# Patient Record
Sex: MALE | Race: WHITE | Employment: UNEMPLOYED | URBAN - METROPOLITAN AREA
[De-identification: names, ages, dates, MRNs, and addresses within clinical notes are randomized per-mention and may not be internally consistent; named-entity substitution may affect disease eponyms.]

---

## 2024-03-22 ENCOUNTER — APPOINTMENT (OUTPATIENT)
Dept: GENERAL RADIOLOGY | Age: 64
DRG: 640 | End: 2024-03-22
Payer: MEDICARE

## 2024-03-22 ENCOUNTER — APPOINTMENT (OUTPATIENT)
Dept: CT IMAGING | Age: 64
DRG: 640 | End: 2024-03-22
Payer: MEDICARE

## 2024-03-22 ENCOUNTER — HOSPITAL ENCOUNTER (INPATIENT)
Age: 64
LOS: 20 days | Discharge: NURSING, REHAB OR LONG-TERM CARE | DRG: 640 | End: 2024-04-11
Attending: EMERGENCY MEDICINE | Admitting: FAMILY MEDICINE
Payer: MEDICARE

## 2024-03-22 DIAGNOSIS — J44.9 CHRONIC OBSTRUCTIVE PULMONARY DISEASE, UNSPECIFIED COPD TYPE (HCC): ICD-10-CM

## 2024-03-22 DIAGNOSIS — R41.82 ALTERED MENTAL STATUS, UNSPECIFIED ALTERED MENTAL STATUS TYPE: Primary | ICD-10-CM

## 2024-03-22 DIAGNOSIS — E87.1 HYPONATREMIA: ICD-10-CM

## 2024-03-22 PROBLEM — J69.0 ASPIRATION PNEUMONIA OF BOTH LOWER LOBES (HCC): Status: ACTIVE | Noted: 2024-03-22

## 2024-03-22 PROBLEM — G93.41 ACUTE METABOLIC ENCEPHALOPATHY: Status: ACTIVE | Noted: 2024-03-22

## 2024-03-22 PROBLEM — I10 HYPERTENSION: Status: ACTIVE | Noted: 2024-03-22

## 2024-03-22 LAB
ALBUMIN SERPL-MCNC: 3.4 G/DL (ref 3.5–5.2)
ALP SERPL-CCNC: 93 U/L (ref 40–129)
ALT SERPL-CCNC: 12 U/L (ref 5–41)
AMPHET UR QL SCN: NEGATIVE
ANION GAP SERPL CALCULATED.3IONS-SCNC: 9 MMOL/L (ref 9–17)
AST SERPL-CCNC: 21 U/L
BARBITURATES UR QL SCN: NEGATIVE
BASOPHILS # BLD: 0.07 K/UL (ref 0–0.2)
BASOPHILS NFR BLD: 1 % (ref 0–2)
BENZODIAZ UR QL: NEGATIVE
BILIRUB SERPL-MCNC: 0.3 MG/DL (ref 0.3–1.2)
BILIRUB UR QL STRIP: NEGATIVE
BNP SERPL-MCNC: 139 PG/ML
BUN SERPL-MCNC: 19 MG/DL (ref 8–23)
BUN/CREAT SERPL: 24 (ref 9–20)
CALCIUM SERPL-MCNC: 10 MG/DL (ref 8.6–10.4)
CANNABINOIDS UR QL SCN: POSITIVE
CHLORIDE SERPL-SCNC: 77 MMOL/L (ref 98–107)
CHLORIDE UR-SCNC: 40 MMOL/L
CLARITY UR: CLEAR
CO2 SERPL-SCNC: 39 MMOL/L (ref 20–31)
COCAINE UR QL SCN: NEGATIVE
COLOR UR: YELLOW
CREAT SERPL-MCNC: 0.8 MG/DL (ref 0.7–1.2)
CRITICAL ACTION: NORMAL
CRITICAL NOTIFICATION DATE/TIME: NORMAL
CRITICAL NOTIFICATION: NORMAL
CRITICAL VALUE READ BACK: YES
EOSINOPHIL # BLD: 0.08 K/UL (ref 0–0.44)
EOSINOPHILS RELATIVE PERCENT: 1 % (ref 1–4)
EPI CELLS #/AREA URNS HPF: ABNORMAL /HPF (ref 0–5)
ERYTHROCYTE [DISTWIDTH] IN BLOOD BY AUTOMATED COUNT: 13.9 % (ref 11.8–14.4)
ETHANOL PERCENT: <0.01 %
ETHANOLAMINE SERPL-MCNC: <10 MG/DL
FENTANYL UR QL: NEGATIVE
FLUAV RNA RESP QL NAA+PROBE: NOT DETECTED
FLUBV RNA RESP QL NAA+PROBE: NOT DETECTED
GFR SERPL CREATININE-BSD FRML MDRD: >60 ML/MIN/1.73M2
GLUCOSE SERPL-MCNC: 80 MG/DL (ref 70–99)
GLUCOSE UR STRIP-MCNC: NEGATIVE MG/DL
HCO3 VENOUS: 46.4 MMOL/L (ref 22–29)
HCT VFR BLD AUTO: 43.7 % (ref 40.7–50.3)
HGB BLD-MCNC: 14.7 G/DL (ref 13–17)
HGB UR QL STRIP.AUTO: NEGATIVE
IMM GRANULOCYTES # BLD AUTO: 0.06 K/UL (ref 0–0.3)
IMM GRANULOCYTES NFR BLD: 1 %
INR PPP: 1
KETONES UR STRIP-MCNC: ABNORMAL MG/DL
LACTATE BLDV-SCNC: 1.3 MMOL/L (ref 0.5–2.2)
LEUKOCYTE ESTERASE UR QL STRIP: NEGATIVE
LYMPHOCYTES NFR BLD: 3.11 K/UL (ref 1.1–3.7)
LYMPHOCYTES RELATIVE PERCENT: 28 % (ref 24–43)
MAGNESIUM SERPL-MCNC: 1.9 MG/DL (ref 1.6–2.6)
MCH RBC QN AUTO: 32.7 PG (ref 25.2–33.5)
MCHC RBC AUTO-ENTMCNC: 33.6 G/DL (ref 28.4–34.8)
MCV RBC AUTO: 97.3 FL (ref 82.6–102.9)
METHADONE UR QL: NEGATIVE
MONOCYTES NFR BLD: 1.16 K/UL (ref 0.1–1.2)
MONOCYTES NFR BLD: 10 % (ref 3–12)
NEUTROPHILS NFR BLD: 59 % (ref 36–65)
NEUTS SEG NFR BLD: 6.79 K/UL (ref 1.5–8.1)
NITRITE UR QL STRIP: NEGATIVE
NRBC BLD-RTO: 0 PER 100 WBC
O2 SAT, VEN: 61.7 % (ref 60–85)
OPIATES UR QL SCN: NEGATIVE
OSMOLALITY UR: 406 MOSM/KG (ref 80–1300)
OXYCODONE UR QL SCN: NEGATIVE
PATIENT TEMP: 37
PCO2, VEN: 72 MM HG (ref 41–51)
PCP UR QL SCN: NEGATIVE
PH UR STRIP: 6 [PH] (ref 5–8)
PH VENOUS: 7.42 (ref 7.32–7.43)
PLATELET # BLD AUTO: 352 K/UL (ref 138–453)
PMV BLD AUTO: 9.1 FL (ref 8.1–13.5)
PO2, VEN: 33.5 MM HG (ref 30–50)
POSITIVE BASE EXCESS, VEN: 16.6 MMOL/L (ref 0–3)
POTASSIUM SERPL-SCNC: 4.5 MMOL/L (ref 3.7–5.3)
PROT SERPL-MCNC: 7.3 G/DL (ref 6.4–8.3)
PROT UR STRIP-MCNC: ABNORMAL MG/DL
PROTHROMBIN TIME: 13.2 SEC (ref 11.5–14.2)
RBC # BLD AUTO: 4.49 M/UL (ref 4.21–5.77)
RBC #/AREA URNS HPF: ABNORMAL /HPF (ref 0–2)
SARS-COV-2 RNA RESP QL NAA+PROBE: NOT DETECTED
SODIUM SERPL-SCNC: 124 MMOL/L (ref 135–144)
SODIUM SERPL-SCNC: 124 MMOL/L (ref 135–144)
SODIUM SERPL-SCNC: 125 MMOL/L (ref 135–144)
SODIUM SERPL-SCNC: 127 MMOL/L (ref 135–144)
SODIUM UR-SCNC: 40 MMOL/L
SOURCE: NORMAL
SP GR UR STRIP: 1.02 (ref 1–1.03)
SPECIMEN DESCRIPTION: NORMAL
TEST INFORMATION: ABNORMAL
TROPONIN I SERPL HS-MCNC: 35 NG/L (ref 0–22)
TROPONIN I SERPL HS-MCNC: 35 NG/L (ref 0–22)
UROBILINOGEN UR STRIP-ACNC: NORMAL EU/DL (ref 0–1)
WBC #/AREA URNS HPF: ABNORMAL /HPF (ref 0–5)
WBC OTHER # BLD: 11.3 K/UL (ref 3.5–11.3)

## 2024-03-22 PROCEDURE — 36415 COLL VENOUS BLD VENIPUNCTURE: CPT

## 2024-03-22 PROCEDURE — 6360000002 HC RX W HCPCS: Performed by: EMERGENCY MEDICINE

## 2024-03-22 PROCEDURE — 2580000003 HC RX 258: Performed by: EMERGENCY MEDICINE

## 2024-03-22 PROCEDURE — 6370000000 HC RX 637 (ALT 250 FOR IP): Performed by: NURSE PRACTITIONER

## 2024-03-22 PROCEDURE — 80053 COMPREHEN METABOLIC PANEL: CPT

## 2024-03-22 PROCEDURE — 99285 EMERGENCY DEPT VISIT HI MDM: CPT

## 2024-03-22 PROCEDURE — 83935 ASSAY OF URINE OSMOLALITY: CPT

## 2024-03-22 PROCEDURE — 84300 ASSAY OF URINE SODIUM: CPT

## 2024-03-22 PROCEDURE — 87636 SARSCOV2 & INF A&B AMP PRB: CPT

## 2024-03-22 PROCEDURE — 85025 COMPLETE CBC W/AUTO DIFF WBC: CPT

## 2024-03-22 PROCEDURE — 6370000000 HC RX 637 (ALT 250 FOR IP)

## 2024-03-22 PROCEDURE — G0480 DRUG TEST DEF 1-7 CLASSES: HCPCS

## 2024-03-22 PROCEDURE — 99223 1ST HOSP IP/OBS HIGH 75: CPT | Performed by: INTERNAL MEDICINE

## 2024-03-22 PROCEDURE — 84484 ASSAY OF TROPONIN QUANT: CPT

## 2024-03-22 PROCEDURE — 94761 N-INVAS EAR/PLS OXIMETRY MLT: CPT

## 2024-03-22 PROCEDURE — 6370000000 HC RX 637 (ALT 250 FOR IP): Performed by: INTERNAL MEDICINE

## 2024-03-22 PROCEDURE — 83735 ASSAY OF MAGNESIUM: CPT

## 2024-03-22 PROCEDURE — 94640 AIRWAY INHALATION TREATMENT: CPT

## 2024-03-22 PROCEDURE — 6360000002 HC RX W HCPCS: Performed by: INTERNAL MEDICINE

## 2024-03-22 PROCEDURE — 84295 ASSAY OF SERUM SODIUM: CPT

## 2024-03-22 PROCEDURE — 70450 CT HEAD/BRAIN W/O DYE: CPT

## 2024-03-22 PROCEDURE — 87040 BLOOD CULTURE FOR BACTERIA: CPT

## 2024-03-22 PROCEDURE — 2060000000 HC ICU INTERMEDIATE R&B

## 2024-03-22 PROCEDURE — 2580000003 HC RX 258

## 2024-03-22 PROCEDURE — 87086 URINE CULTURE/COLONY COUNT: CPT

## 2024-03-22 PROCEDURE — 81001 URINALYSIS AUTO W/SCOPE: CPT

## 2024-03-22 PROCEDURE — 71045 X-RAY EXAM CHEST 1 VIEW: CPT

## 2024-03-22 PROCEDURE — 96374 THER/PROPH/DIAG INJ IV PUSH: CPT

## 2024-03-22 PROCEDURE — 87184 SC STD DISK METHOD PER PLATE: CPT

## 2024-03-22 PROCEDURE — 87077 CULTURE AEROBIC IDENTIFY: CPT

## 2024-03-22 PROCEDURE — 83880 ASSAY OF NATRIURETIC PEPTIDE: CPT

## 2024-03-22 PROCEDURE — 6360000002 HC RX W HCPCS

## 2024-03-22 PROCEDURE — 82803 BLOOD GASES ANY COMBINATION: CPT

## 2024-03-22 PROCEDURE — 93005 ELECTROCARDIOGRAM TRACING: CPT

## 2024-03-22 PROCEDURE — 2700000000 HC OXYGEN THERAPY PER DAY

## 2024-03-22 PROCEDURE — 2580000003 HC RX 258: Performed by: INTERNAL MEDICINE

## 2024-03-22 PROCEDURE — 2580000003 HC RX 258: Performed by: NURSE PRACTITIONER

## 2024-03-22 PROCEDURE — 83605 ASSAY OF LACTIC ACID: CPT

## 2024-03-22 PROCEDURE — 80307 DRUG TEST PRSMV CHEM ANLYZR: CPT

## 2024-03-22 PROCEDURE — 87186 SC STD MICRODIL/AGAR DIL: CPT

## 2024-03-22 PROCEDURE — 85610 PROTHROMBIN TIME: CPT

## 2024-03-22 PROCEDURE — 82436 ASSAY OF URINE CHLORIDE: CPT

## 2024-03-22 RX ORDER — SODIUM CHLORIDE 0.9 % (FLUSH) 0.9 %
5-40 SYRINGE (ML) INJECTION EVERY 12 HOURS SCHEDULED
Status: DISCONTINUED | OUTPATIENT
Start: 2024-03-22 | End: 2024-04-11 | Stop reason: HOSPADM

## 2024-03-22 RX ORDER — PANTOPRAZOLE SODIUM 40 MG/1
40 TABLET, DELAYED RELEASE ORAL
Status: DISCONTINUED | OUTPATIENT
Start: 2024-03-23 | End: 2024-04-11 | Stop reason: HOSPADM

## 2024-03-22 RX ORDER — CYCLOBENZAPRINE HCL 10 MG
10 TABLET ORAL 3 TIMES DAILY PRN
COMMUNITY
Start: 2024-02-20

## 2024-03-22 RX ORDER — LANOLIN ALCOHOL/MO/W.PET/CERES
3 CREAM (GRAM) TOPICAL NIGHTLY PRN
Status: DISCONTINUED | OUTPATIENT
Start: 2024-03-22 | End: 2024-04-03

## 2024-03-22 RX ORDER — AZITHROMYCIN 250 MG/1
500 TABLET, FILM COATED ORAL DAILY
Status: COMPLETED | OUTPATIENT
Start: 2024-03-22 | End: 2024-03-24

## 2024-03-22 RX ORDER — ONDANSETRON 4 MG/1
4 TABLET, ORALLY DISINTEGRATING ORAL EVERY 8 HOURS PRN
Status: DISCONTINUED | OUTPATIENT
Start: 2024-03-22 | End: 2024-04-11 | Stop reason: HOSPADM

## 2024-03-22 RX ORDER — ACETAMINOPHEN 650 MG/1
650 SUPPOSITORY RECTAL EVERY 6 HOURS PRN
Status: DISCONTINUED | OUTPATIENT
Start: 2024-03-22 | End: 2024-04-11 | Stop reason: HOSPADM

## 2024-03-22 RX ORDER — ONDANSETRON 2 MG/ML
4 INJECTION INTRAMUSCULAR; INTRAVENOUS EVERY 6 HOURS PRN
Status: DISCONTINUED | OUTPATIENT
Start: 2024-03-22 | End: 2024-04-11 | Stop reason: HOSPADM

## 2024-03-22 RX ORDER — POTASSIUM CHLORIDE 7.45 MG/ML
10 INJECTION INTRAVENOUS PRN
Status: DISCONTINUED | OUTPATIENT
Start: 2024-03-22 | End: 2024-04-11 | Stop reason: HOSPADM

## 2024-03-22 RX ORDER — IPRATROPIUM BROMIDE AND ALBUTEROL SULFATE 2.5; .5 MG/3ML; MG/3ML
1 SOLUTION RESPIRATORY (INHALATION) EVERY 4 HOURS PRN
Status: DISCONTINUED | OUTPATIENT
Start: 2024-03-22 | End: 2024-04-11 | Stop reason: HOSPADM

## 2024-03-22 RX ORDER — SODIUM CHLORIDE 9 MG/ML
INJECTION, SOLUTION INTRAVENOUS PRN
Status: DISCONTINUED | OUTPATIENT
Start: 2024-03-22 | End: 2024-04-11 | Stop reason: HOSPADM

## 2024-03-22 RX ORDER — POLYETHYLENE GLYCOL 3350 17 G/17G
17 POWDER, FOR SOLUTION ORAL DAILY PRN
Status: DISCONTINUED | OUTPATIENT
Start: 2024-03-22 | End: 2024-04-11 | Stop reason: HOSPADM

## 2024-03-22 RX ORDER — MAGNESIUM OXIDE 400 MG/1
400 TABLET ORAL DAILY
COMMUNITY

## 2024-03-22 RX ORDER — SODIUM CHLORIDE 0.9 % (FLUSH) 0.9 %
10 SYRINGE (ML) INJECTION PRN
Status: DISCONTINUED | OUTPATIENT
Start: 2024-03-22 | End: 2024-04-11 | Stop reason: HOSPADM

## 2024-03-22 RX ORDER — PANTOPRAZOLE SODIUM 40 MG/1
40 TABLET, DELAYED RELEASE ORAL DAILY
COMMUNITY

## 2024-03-22 RX ORDER — POTASSIUM CHLORIDE 20 MEQ/1
40 TABLET, EXTENDED RELEASE ORAL PRN
Status: DISCONTINUED | OUTPATIENT
Start: 2024-03-22 | End: 2024-04-11 | Stop reason: HOSPADM

## 2024-03-22 RX ORDER — LANOLIN ALCOHOL/MO/W.PET/CERES
400 CREAM (GRAM) TOPICAL DAILY
Status: DISCONTINUED | OUTPATIENT
Start: 2024-03-22 | End: 2024-04-11 | Stop reason: HOSPADM

## 2024-03-22 RX ORDER — ENOXAPARIN SODIUM 100 MG/ML
40 INJECTION SUBCUTANEOUS DAILY
Status: DISCONTINUED | OUTPATIENT
Start: 2024-03-23 | End: 2024-04-11 | Stop reason: HOSPADM

## 2024-03-22 RX ORDER — MAGNESIUM SULFATE 1 G/100ML
1000 INJECTION INTRAVENOUS PRN
Status: DISCONTINUED | OUTPATIENT
Start: 2024-03-22 | End: 2024-04-11 | Stop reason: HOSPADM

## 2024-03-22 RX ORDER — ACETAMINOPHEN 325 MG/1
650 TABLET ORAL EVERY 6 HOURS PRN
Status: DISCONTINUED | OUTPATIENT
Start: 2024-03-22 | End: 2024-04-11 | Stop reason: HOSPADM

## 2024-03-22 RX ORDER — ALBUTEROL SULFATE 90 UG/1
2 AEROSOL, METERED RESPIRATORY (INHALATION) EVERY 6 HOURS PRN
Status: DISCONTINUED | OUTPATIENT
Start: 2024-03-22 | End: 2024-04-11 | Stop reason: HOSPADM

## 2024-03-22 RX ORDER — IPRATROPIUM BROMIDE AND ALBUTEROL SULFATE 2.5; .5 MG/3ML; MG/3ML
1 SOLUTION RESPIRATORY (INHALATION) ONCE
Status: COMPLETED | OUTPATIENT
Start: 2024-03-22 | End: 2024-03-22

## 2024-03-22 RX ORDER — IPRATROPIUM BROMIDE AND ALBUTEROL SULFATE 2.5; .5 MG/3ML; MG/3ML
1 SOLUTION RESPIRATORY (INHALATION)
Status: DISCONTINUED | OUTPATIENT
Start: 2024-03-22 | End: 2024-03-24

## 2024-03-22 RX ORDER — LISINOPRIL 10 MG/1
40 TABLET ORAL DAILY
Status: CANCELLED | OUTPATIENT
Start: 2024-03-22

## 2024-03-22 RX ADMIN — IPRATROPIUM BROMIDE AND ALBUTEROL SULFATE 1 DOSE: 2.5; .5 SOLUTION RESPIRATORY (INHALATION) at 19:28

## 2024-03-22 RX ADMIN — AZITHROMYCIN DIHYDRATE 500 MG: 250 TABLET ORAL at 18:15

## 2024-03-22 RX ADMIN — ZIPRASIDONE MESYLATE 10 MG: 20 INJECTION, POWDER, LYOPHILIZED, FOR SOLUTION INTRAMUSCULAR at 14:04

## 2024-03-22 RX ADMIN — SODIUM CHLORIDE, PRESERVATIVE FREE 10 ML: 5 INJECTION INTRAVENOUS at 20:03

## 2024-03-22 RX ADMIN — METHYLPREDNISOLONE SODIUM SUCCINATE 125 MG: 125 INJECTION INTRAMUSCULAR; INTRAVENOUS at 10:38

## 2024-03-22 RX ADMIN — WATER 1000 MG: 1 INJECTION INTRAMUSCULAR; INTRAVENOUS; SUBCUTANEOUS at 18:15

## 2024-03-22 RX ADMIN — Medication 400 MG: at 18:15

## 2024-03-22 RX ADMIN — IPRATROPIUM BROMIDE AND ALBUTEROL SULFATE 1 DOSE: 2.5; .5 SOLUTION RESPIRATORY (INHALATION) at 11:02

## 2024-03-22 RX ADMIN — Medication 3 MG: at 22:25

## 2024-03-22 NOTE — ACP (ADVANCE CARE PLANNING)
Advance Care Planning     Advance Care Planning Activator (Inpatient)  Conversation Note      Date of ACP Conversation: 3/22/2024     Conversation Conducted with: unable to assess. No family    ACP Activator: MARY ANN CORDOVA RN          Health Care Decision Maker:     Current Designated Health Care Decision Maker:     Click here to complete Healthcare Decision Makers including section of the Healthcare Decision Maker Relationship (ie \"Primary\")  Today we unable to assess as no family and patient confused.     Care Preferences    Ventilation:  \"If you were in your present state of health and suddenly became very ill and were unable to breathe on your own, what would your preference be about the use of a ventilator (breathing machine) if it were available to you?\"      Would the patient desire the use of ventilator (breathing machine)?:  unable to assess    \"If your health worsens and it becomes clear that your chance of recovery is unlikely, what would your preference be about the use of a ventilator (breathing machine) if it were available to you?\"     Would the patient desire the use of ventilator (breathing machine)?: No unable to assess      Resuscitation  \"CPR works best to restart the heart when there is a sudden event, like a heart attack, in someone who is otherwise healthy. Unfortunately, CPR does not typically restart the heart for people who have serious health conditions or who are very sick.\"    \"In the event your heart stopped as a result of an underlying serious health condition, would you want attempts to be made to restart your heart (answer \"yes\" for attempt to resuscitate) or would you prefer a natural death (answer \"no\" for do not attempt to resuscitate)?\"  Unable to assess        [] Yes   [] No   Educated Patient / Decision Maker regarding differences between Advance Directives and portable DNR orders.    Length of ACP Conversation in minutes:      Conversation Outcomes:  DECLINED ACP

## 2024-03-22 NOTE — H&P
[facility]  Office: [phone]  Ze Fortune DO, Christiano Herrera DO, Alvin Cummings DO, Lukasz Aaron DO, Garrett Turk MD, Myrna Lopez MD, Eugene Fiore MD, Renita Corbin MD,  Jg Hodgson MD, Markel Grace MD, Chapito Wallace MD,  Christi Mota DO, Roxy Manzano MD, Dominick Montoya MD, Irvin Fortune DO, Bonita Briseno MD,  Dale Hampton DO, Kya Aragon MD, Carin Amador MD, Daily De La Torre MD, Harinder Garcia MD,  Salomón Ricks MD, Steven Barrientos MD, Blaire Muro MD, Mora Jacobo MD, Bunny Omer MD, Libra Huerta MD, Ciro Bonds DO, Nino Salazar DO, Garrett Copeland MD,  Yovany Rosales MD, Shirley Waterhouse, CNP,  Lauren Marsh, CNP, Carlos Caballero, CNP,  Rossy Gutierrez, DNP, Sakshi Storm, CNP, Racquel Garza, CNP, Malorie Rico CNP, Barb Zuñiga, CNP, Amara Galvan, CNP, Greta Shen, PA-C, Ladonna Gary, PA-C, Linnea Catalan, CNP, Georgie Leung, CNP, Farzaneh Stringer, CNP, Aruna Lamb, CNS, Doris Johnston, CNP, Shabana Kirkpatrick, CNP, Tracy Schwab, CNP         [facility]   IN-PATIENT SERVICE   [facility]    HISTORY AND PHYSICAL EXAMINATION            Date:   3/22/2024  Patient name:  Alonzo Crespo  Date of admission:  3/22/2024 10:08 AM  MRN:   [id number]  Account:  [id number]  YOB: 1960  PCP:    Ana Méndez  Room:   [facility] DELFINO/DELFINO  Code Status:    No Order    Chief Complaint:     Chief Complaint   Patient presents with    Altered Mental Status       History Obtained From:     patient, electronic medical record, Quality of history:  poor historian    History of Present Illness:     Alonzo Crespo is a 63 y.o. Non- / non  male who presents with Altered Mental Status   and is admitted to the hospital for the management of Acute metabolic encephalopathy.    This 63 yom presents with ams from home.  A friend called 911-reportedly due to the confusion.  He is unable to give me any specific info as

## 2024-03-22 NOTE — ED PROVIDER NOTES
[facility] ED  eMERGENCY dEPARTMENT eNCOUnter      Pt Name: Alonzo Olivarez  MRN: [id number]  Birthdate 1960  Date of evaluation: 3/22/2024  Provider: DIMITRIOS sOborne CNP    CHIEF COMPLAINT       Chief Complaint   Patient presents with    Altered Mental Status         HISTORY OF PRESENT ILLNESS  (Location/Symptom, Timing/Onset, Context/Setting, Quality, Duration, Modifying Factors, Severity.)   Alonzo Olivarez is a 63 y.o. male who presents to the emergency department via EMS with complaint of altered mental status.  [place], states that his friend called 911 today as he appeared confused.  Patient denies any headache or vision changes, states that he has had problems with his memory recently, frequently forgetting what he was going to do.  Patient with advanced COPD requiring continuous oxygen supplementation via nasal cannula at 2 L.  Patient states that he is unsure of when he started feeling unwell.  States that he does see cardiology, unsure if he has ever had a stroke but was told that he has had a TIA in the past.  Patient reports history of blood clot, unable to tell me if it was a DVT or pulmonary embolism.      Nursing Notes were reviewed.    ALLERGIES     Pcn [penicillins]    CURRENT MEDICATIONS       Previous Medications    ALBUTEROL (PROVENTIL HFA;VENTOLIN HFA) 108 (90 BASE) MCG/ACT INHALER    Inhale 2 puffs into the lungs every 6 hours as needed for Wheezing    CYCLOBENZAPRINE (FLEXERIL) 10 MG TABLET        HYDROCHLOROTHIAZIDE (HYDRODIURIL) 25 MG TABLET    Take 25 mg by mouth daily    IPRATROPIUM (ATROVENT HFA) 17 MCG/ACT INHALER    Inhale 2 puffs into the lungs every 6 hours    LISINOPRIL (PRINIVIL;ZESTRIL) 40 MG TABLET    Take 40 mg by mouth daily    MAGNESIUM OXIDE (MAG-OX) 140 MG CAPS    Take 400 mg by mouth daily    OMEPRAZOLE (PRILOSEC) 20 MG CAPSULE    Take 20 mg by mouth daily    SIMVASTATIN PO    Take by mouth       PAST MEDICAL

## 2024-03-22 NOTE — ED PROVIDER NOTES
EMERGENCY DEPARTMENT ENCOUNTER   ATTENDING ATTESTATION     Pt Name: Alonzo Olivarez  MRN: [id number]  Birthdate 1960  Date of evaluation: 3/22/24   Alonzo Olivarez is a 63 y.o. male with CC: Altered Mental Status    MDM:   I performed a substantive part of the MDM during the patient's E/M visit. I personally evaluated and examined the patient. I personally made or approved the documented management plan and acknowledge its risk of complications.    Independent Interpretation: My (EKG/X-Ray/US/CT) interpretation patient's UA shows a narrow complex rhythm with a rate of 81, QRS QTc intervals unremarkable the patient has normal axis no ST elevations or depressions, nonspecific EKG.    Discussion: Management/test interpretation discussed with     63-year-old male presents with complaints of confusion, the patient has evidence of hypercapnia but his pH is normal, he has significantly elevated bicarb.  I believe he just has bad COPD with potential other causes for his altered mental status.  We will discuss the case with pulmonology and plan for admission to the hospitalist for further workup.    *  ED Course as of 03/22/24 1230   Fri Mar 22, 2024   1041 WBC: 11.3 [AJ]   1041 RBC: 4.49 [AJ]   1041 Hemoglobin Quant: 14.7 [AJ]   1041 Hematocrit: 43.7 [AJ]   1153 pCO2, Jose Angel(!!): 72.0 [AJ]   1153 HCO3, Venous(!): 46.4 [AJ]   1153 Positive Base Excess, Jose Angel(!): 16.6 [AJ]   1153 SARS-CoV-2 RNA, RT PCR: Not Detected [AJ]   1153 INFLUENZA A: Not Detected [AJ]   1153 INFLUENZA B: Not Detected [AJ]   1153 Troponin, High Sensitivity(!): 35  Repeat troponin ordered. [AJ]   1153 CO2(!): 39 [AJ]   1154 Sodium(!): 125 [AJ]   1154 Potassium: 4.5 [AJ]   1154 Lactic Acid: 1.3 [AJ]   1154 Ethanol percent: <0.010 [AJ]   1154 ETHANOL,ETHA: <10 [AJ]   1154 Prothrombin Time: 13.2 [AJ]   1219 CT HEAD WO CONTRAST [AJ]      ED Course User Index  [AJ] Lizette White, APRN - CNP       CRITICAL CARE:       EKG: All EKG's are interpreted by the

## 2024-03-22 NOTE — RT PROTOCOL NOTE
RT Inhaler-Nebulizer Bronchodilator Protocol Note    There is a bronchodilator order in the chart from a provider indicating to follow the RT Bronchodilator Protocol and there is an “Initiate RT Inhaler-Nebulizer Bronchodilator Protocol” order as well (see protocol at bottom of note).    CXR Findings:  XR CHEST PORTABLE    Result Date: 3/22/2024  Bibasilar airspace disease, left greater than right.  Follow-up is recommended to document resolution.       The findings from the last RT Protocol Assessment were as follows:   History Pulmonary Disease: Chronic pulmonary disease  Respiratory Pattern: Dyspnea on exertion or RR 21-25 bpm  Breath Sounds: Inspiratory and expiratory or bilateral wheezing and/or rhonchi  Cough: Strong, spontaneous, non-productive  Indication for Bronchodilator Therapy: Wheezing associated with pulm disorder  Bronchodilator Assessment Score: 10    Aerosolized bronchodilator medication orders have been revised according to the RT Inhaler-Nebulizer Bronchodilator Protocol below.    Respiratory Therapist to perform RT Therapy Protocol Assessment initially then follow the protocol.  Repeat RT Therapy Protocol Assessment PRN for score 0-3 or on second treatment, BID, and PRN for scores above 3.    No Indications - adjust the frequency to every 6 hours PRN wheezing or bronchospasm, if no treatments needed after 48 hours then discontinue using Per Protocol order mode.     If indication present, adjust the RT bronchodilator orders based on the Bronchodilator Assessment Score as indicated below.  Use Inhaler orders unless patient has one or more of the following: on home nebulizer, not able to hold breath for 10 seconds, is not alert and oriented, cannot activate and use MDI correctly, or respiratory rate 25 breaths per minute or more, then use the equivalent nebulizer order(s) with same Frequency and PRN reasons based on the score.  If a patient is on this medication at home then do not decrease

## 2024-03-23 PROBLEM — Z72.0 TOBACCO ABUSE: Status: ACTIVE | Noted: 2024-03-23

## 2024-03-23 PROBLEM — J96.12 CHRONIC RESPIRATORY FAILURE WITH HYPOXIA AND HYPERCAPNIA (HCC): Status: ACTIVE | Noted: 2024-03-23

## 2024-03-23 PROBLEM — R41.82 ALTERED MENTAL STATUS: Status: ACTIVE | Noted: 2024-03-23

## 2024-03-23 PROBLEM — J96.11 CHRONIC RESPIRATORY FAILURE WITH HYPOXIA AND HYPERCAPNIA (HCC): Status: ACTIVE | Noted: 2024-03-23

## 2024-03-23 LAB
ANION GAP SERPL CALCULATED.3IONS-SCNC: 10 MMOL/L (ref 9–17)
ANION GAP SERPL CALCULATED.3IONS-SCNC: 7 MMOL/L (ref 9–17)
BASOPHILS # BLD: 0 K/UL (ref 0–0.2)
BASOPHILS NFR BLD: 0 % (ref 0–2)
BUN SERPL-MCNC: 25 MG/DL (ref 8–23)
BUN SERPL-MCNC: 26 MG/DL (ref 8–23)
BUN/CREAT SERPL: 33 (ref 9–20)
BUN/CREAT SERPL: 36 (ref 9–20)
CALCIUM SERPL-MCNC: 9.1 MG/DL (ref 8.6–10.4)
CALCIUM SERPL-MCNC: 9.3 MG/DL (ref 8.6–10.4)
CHLORIDE SERPL-SCNC: 77 MMOL/L (ref 98–107)
CHLORIDE SERPL-SCNC: 77 MMOL/L (ref 98–107)
CO2 SERPL-SCNC: 39 MMOL/L (ref 20–31)
CO2 SERPL-SCNC: 43 MMOL/L (ref 20–31)
CORTIS SERPL-MCNC: 6.8 UG/DL (ref 2.5–19.5)
CREAT SERPL-MCNC: 0.7 MG/DL (ref 0.7–1.2)
CREAT SERPL-MCNC: 0.8 MG/DL (ref 0.7–1.2)
EKG ATRIAL RATE: 81 BPM
EKG Q-T INTERVAL: 374 MS
EKG QRS DURATION: 92 MS
EKG QTC CALCULATION (BAZETT): 434 MS
EKG R AXIS: 50 DEGREES
EKG T AXIS: 108 DEGREES
EKG VENTRICULAR RATE: 81 BPM
EOSINOPHIL # BLD: 0 K/UL (ref 0–0.44)
EOSINOPHILS RELATIVE PERCENT: 0 % (ref 1–4)
ERYTHROCYTE [DISTWIDTH] IN BLOOD BY AUTOMATED COUNT: 14.1 % (ref 11.8–14.4)
GFR SERPL CREATININE-BSD FRML MDRD: >60 ML/MIN/1.73M2
GFR SERPL CREATININE-BSD FRML MDRD: >60 ML/MIN/1.73M2
GLUCOSE SERPL-MCNC: 129 MG/DL (ref 70–99)
GLUCOSE SERPL-MCNC: 143 MG/DL (ref 70–99)
HCT VFR BLD AUTO: 39.1 % (ref 40.7–50.3)
HGB BLD-MCNC: 13.5 G/DL (ref 13–17)
IMM GRANULOCYTES # BLD AUTO: 0 K/UL (ref 0–0.3)
IMM GRANULOCYTES NFR BLD: 0 %
LYMPHOCYTES NFR BLD: 0.44 K/UL (ref 1.1–3.7)
LYMPHOCYTES RELATIVE PERCENT: 2 % (ref 24–43)
MCH RBC QN AUTO: 32.5 PG (ref 25.2–33.5)
MCHC RBC AUTO-ENTMCNC: 34.5 G/DL (ref 28.4–34.8)
MCV RBC AUTO: 94 FL (ref 82.6–102.9)
MONOCYTES NFR BLD: 1.33 K/UL (ref 0.1–1.2)
MONOCYTES NFR BLD: 6 % (ref 3–12)
NEUTROPHILS NFR BLD: 92 % (ref 36–65)
NEUTS SEG NFR BLD: 20.33 K/UL (ref 1.5–8.1)
NRBC BLD-RTO: 0 PER 100 WBC
PLATELET # BLD AUTO: 371 K/UL (ref 138–453)
PMV BLD AUTO: 9.4 FL (ref 8.1–13.5)
POTASSIUM SERPL-SCNC: 3.9 MMOL/L (ref 3.7–5.3)
POTASSIUM SERPL-SCNC: 3.9 MMOL/L (ref 3.7–5.3)
RBC # BLD AUTO: 4.16 M/UL (ref 4.21–5.77)
SODIUM SERPL-SCNC: 122 MMOL/L (ref 135–144)
SODIUM SERPL-SCNC: 124 MMOL/L (ref 135–144)
SODIUM SERPL-SCNC: 126 MMOL/L (ref 135–144)
SODIUM SERPL-SCNC: 127 MMOL/L (ref 135–144)
SODIUM SERPL-SCNC: 127 MMOL/L (ref 135–144)
SODIUM SERPL-SCNC: 128 MMOL/L (ref 135–144)
TSH SERPL DL<=0.05 MIU/L-ACNC: 2.06 UIU/ML (ref 0.3–5)
WBC OTHER # BLD: 22.1 K/UL (ref 3.5–11.3)

## 2024-03-23 PROCEDURE — 6370000000 HC RX 637 (ALT 250 FOR IP): Performed by: NURSE PRACTITIONER

## 2024-03-23 PROCEDURE — 6360000002 HC RX W HCPCS: Performed by: NURSE PRACTITIONER

## 2024-03-23 PROCEDURE — 6370000000 HC RX 637 (ALT 250 FOR IP): Performed by: INTERNAL MEDICINE

## 2024-03-23 PROCEDURE — 6360000002 HC RX W HCPCS: Performed by: INTERNAL MEDICINE

## 2024-03-23 PROCEDURE — 94640 AIRWAY INHALATION TREATMENT: CPT

## 2024-03-23 PROCEDURE — 94761 N-INVAS EAR/PLS OXIMETRY MLT: CPT

## 2024-03-23 PROCEDURE — 2580000003 HC RX 258: Performed by: INTERNAL MEDICINE

## 2024-03-23 PROCEDURE — 93010 ELECTROCARDIOGRAM REPORT: CPT | Performed by: INTERNAL MEDICINE

## 2024-03-23 PROCEDURE — 84295 ASSAY OF SERUM SODIUM: CPT

## 2024-03-23 PROCEDURE — 85025 COMPLETE CBC W/AUTO DIFF WBC: CPT

## 2024-03-23 PROCEDURE — 36415 COLL VENOUS BLD VENIPUNCTURE: CPT

## 2024-03-23 PROCEDURE — 2060000000 HC ICU INTERMEDIATE R&B

## 2024-03-23 PROCEDURE — 2700000000 HC OXYGEN THERAPY PER DAY

## 2024-03-23 PROCEDURE — 82533 TOTAL CORTISOL: CPT

## 2024-03-23 PROCEDURE — 84443 ASSAY THYROID STIM HORMONE: CPT

## 2024-03-23 PROCEDURE — 99232 SBSQ HOSP IP/OBS MODERATE 35: CPT | Performed by: INTERNAL MEDICINE

## 2024-03-23 PROCEDURE — 80048 BASIC METABOLIC PNL TOTAL CA: CPT

## 2024-03-23 PROCEDURE — 2580000003 HC RX 258: Performed by: NURSE PRACTITIONER

## 2024-03-23 PROCEDURE — 99222 1ST HOSP IP/OBS MODERATE 55: CPT | Performed by: NURSE PRACTITIONER

## 2024-03-23 RX ORDER — OLANZAPINE 5 MG/1
5 TABLET ORAL ONCE
Status: COMPLETED | OUTPATIENT
Start: 2024-03-23 | End: 2024-03-23

## 2024-03-23 RX ORDER — SODIUM CHLORIDE 1 G/1
1 TABLET ORAL
Status: DISCONTINUED | OUTPATIENT
Start: 2024-03-23 | End: 2024-03-27

## 2024-03-23 RX ORDER — RIVASTIGMINE 4.6 MG/24H
1 PATCH, EXTENDED RELEASE TRANSDERMAL DAILY
Status: DISCONTINUED | OUTPATIENT
Start: 2024-03-23 | End: 2024-04-11 | Stop reason: HOSPADM

## 2024-03-23 RX ORDER — LORAZEPAM 2 MG/ML
1 INJECTION INTRAMUSCULAR ONCE
Status: DISCONTINUED | OUTPATIENT
Start: 2024-03-23 | End: 2024-04-06

## 2024-03-23 RX ADMIN — SODIUM CHLORIDE, PRESERVATIVE FREE 10 ML: 5 INJECTION INTRAVENOUS at 21:05

## 2024-03-23 RX ADMIN — AZITHROMYCIN DIHYDRATE 500 MG: 250 TABLET ORAL at 07:54

## 2024-03-23 RX ADMIN — Medication 30 G: at 18:19

## 2024-03-23 RX ADMIN — ENOXAPARIN SODIUM 40 MG: 100 INJECTION SUBCUTANEOUS at 07:54

## 2024-03-23 RX ADMIN — SODIUM CHLORIDE, PRESERVATIVE FREE 10 ML: 5 INJECTION INTRAVENOUS at 07:59

## 2024-03-23 RX ADMIN — Medication 1 G: at 07:54

## 2024-03-23 RX ADMIN — WATER 1000 MG: 1 INJECTION INTRAMUSCULAR; INTRAVENOUS; SUBCUTANEOUS at 15:58

## 2024-03-23 RX ADMIN — PANTOPRAZOLE SODIUM 40 MG: 40 TABLET, DELAYED RELEASE ORAL at 07:56

## 2024-03-23 RX ADMIN — Medication 3 MG: at 22:20

## 2024-03-23 RX ADMIN — ACETAMINOPHEN 650 MG: 325 TABLET ORAL at 17:31

## 2024-03-23 RX ADMIN — OLANZAPINE 5 MG: 5 TABLET, FILM COATED ORAL at 22:19

## 2024-03-23 RX ADMIN — Medication 1 G: at 16:01

## 2024-03-23 RX ADMIN — Medication 1 G: at 12:19

## 2024-03-23 RX ADMIN — POLYETHYLENE GLYCOL 3350 17 G: 17 POWDER, FOR SOLUTION ORAL at 22:01

## 2024-03-23 RX ADMIN — Medication 400 MG: at 07:54

## 2024-03-23 RX ADMIN — IPRATROPIUM BROMIDE AND ALBUTEROL SULFATE 1 DOSE: 2.5; .5 SOLUTION RESPIRATORY (INHALATION) at 07:44

## 2024-03-23 ASSESSMENT — PAIN - FUNCTIONAL ASSESSMENT: PAIN_FUNCTIONAL_ASSESSMENT: ACTIVITIES ARE NOT PREVENTED

## 2024-03-23 ASSESSMENT — PAIN DESCRIPTION - ORIENTATION: ORIENTATION: LEFT;RIGHT

## 2024-03-23 ASSESSMENT — PAIN DESCRIPTION - DESCRIPTORS: DESCRIPTORS: ACHING

## 2024-03-23 ASSESSMENT — PAIN DESCRIPTION - LOCATION: LOCATION: BACK

## 2024-03-23 ASSESSMENT — PAIN SCALES - GENERAL: PAINLEVEL_OUTOF10: 1

## 2024-03-24 ENCOUNTER — APPOINTMENT (OUTPATIENT)
Dept: CT IMAGING | Age: 64
DRG: 640 | End: 2024-03-24
Payer: MEDICARE

## 2024-03-24 LAB
ANION GAP SERPL CALCULATED.3IONS-SCNC: 2 MMOL/L (ref 9–17)
BILIRUB UR QL STRIP: NEGATIVE
BUN SERPL-MCNC: 33 MG/DL (ref 8–23)
BUN/CREAT SERPL: 41 (ref 9–20)
CALCIUM SERPL-MCNC: 9 MG/DL (ref 8.6–10.4)
CHLORIDE SERPL-SCNC: 81 MMOL/L (ref 98–107)
CLARITY UR: CLEAR
CO2 SERPL-SCNC: 44 MMOL/L (ref 20–31)
COLOR UR: YELLOW
CREAT SERPL-MCNC: 0.8 MG/DL (ref 0.7–1.2)
EPI CELLS #/AREA URNS HPF: NORMAL /HPF (ref 0–5)
GFR SERPL CREATININE-BSD FRML MDRD: >60 ML/MIN/1.73M2
GLUCOSE SERPL-MCNC: 123 MG/DL (ref 70–99)
GLUCOSE UR STRIP-MCNC: NEGATIVE MG/DL
HGB UR QL STRIP.AUTO: NEGATIVE
KETONES UR STRIP-MCNC: NEGATIVE MG/DL
LEUKOCYTE ESTERASE UR QL STRIP: NEGATIVE
NITRITE UR QL STRIP: NEGATIVE
PH UR STRIP: 6.5 [PH] (ref 5–8)
POTASSIUM SERPL-SCNC: 3.9 MMOL/L (ref 3.7–5.3)
PROT UR STRIP-MCNC: ABNORMAL MG/DL
RBC #/AREA URNS HPF: NORMAL /HPF (ref 0–2)
SODIUM SERPL-SCNC: 124 MMOL/L (ref 135–144)
SODIUM SERPL-SCNC: 125 MMOL/L (ref 135–144)
SODIUM SERPL-SCNC: 126 MMOL/L (ref 135–144)
SODIUM SERPL-SCNC: 127 MMOL/L (ref 135–144)
SODIUM SERPL-SCNC: 127 MMOL/L (ref 135–144)
SODIUM SERPL-SCNC: 128 MMOL/L (ref 135–144)
SODIUM SERPL-SCNC: 130 MMOL/L (ref 135–144)
SODIUM SERPL-SCNC: 136 MMOL/L (ref 135–144)
SODIUM UR-SCNC: <20 MMOL/L
SP GR UR STRIP: 1.01 (ref 1–1.03)
UROBILINOGEN UR STRIP-ACNC: NORMAL EU/DL (ref 0–1)
WBC #/AREA URNS HPF: NORMAL /HPF (ref 0–5)

## 2024-03-24 PROCEDURE — 2580000003 HC RX 258: Performed by: INTERNAL MEDICINE

## 2024-03-24 PROCEDURE — 97116 GAIT TRAINING THERAPY: CPT

## 2024-03-24 PROCEDURE — 6370000000 HC RX 637 (ALT 250 FOR IP): Performed by: NURSE PRACTITIONER

## 2024-03-24 PROCEDURE — 6370000000 HC RX 637 (ALT 250 FOR IP): Performed by: INTERNAL MEDICINE

## 2024-03-24 PROCEDURE — 80048 BASIC METABOLIC PNL TOTAL CA: CPT

## 2024-03-24 PROCEDURE — 71250 CT THORAX DX C-: CPT

## 2024-03-24 PROCEDURE — 81001 URINALYSIS AUTO W/SCOPE: CPT

## 2024-03-24 PROCEDURE — 94640 AIRWAY INHALATION TREATMENT: CPT

## 2024-03-24 PROCEDURE — 36415 COLL VENOUS BLD VENIPUNCTURE: CPT

## 2024-03-24 PROCEDURE — 94761 N-INVAS EAR/PLS OXIMETRY MLT: CPT

## 2024-03-24 PROCEDURE — 84295 ASSAY OF SERUM SODIUM: CPT

## 2024-03-24 PROCEDURE — 2580000003 HC RX 258: Performed by: NURSE PRACTITIONER

## 2024-03-24 PROCEDURE — 97162 PT EVAL MOD COMPLEX 30 MIN: CPT

## 2024-03-24 PROCEDURE — 84300 ASSAY OF URINE SODIUM: CPT

## 2024-03-24 PROCEDURE — 2700000000 HC OXYGEN THERAPY PER DAY

## 2024-03-24 PROCEDURE — 99232 SBSQ HOSP IP/OBS MODERATE 35: CPT | Performed by: INTERNAL MEDICINE

## 2024-03-24 PROCEDURE — 6360000002 HC RX W HCPCS: Performed by: INTERNAL MEDICINE

## 2024-03-24 PROCEDURE — 97166 OT EVAL MOD COMPLEX 45 MIN: CPT

## 2024-03-24 PROCEDURE — 97535 SELF CARE MNGMENT TRAINING: CPT

## 2024-03-24 PROCEDURE — 97530 THERAPEUTIC ACTIVITIES: CPT

## 2024-03-24 PROCEDURE — 2060000000 HC ICU INTERMEDIATE R&B

## 2024-03-24 PROCEDURE — 6360000002 HC RX W HCPCS: Performed by: NURSE PRACTITIONER

## 2024-03-24 RX ORDER — OLANZAPINE 5 MG/1
2.5 TABLET ORAL ONCE
Status: COMPLETED | OUTPATIENT
Start: 2024-03-24 | End: 2024-03-24

## 2024-03-24 RX ORDER — IPRATROPIUM BROMIDE AND ALBUTEROL SULFATE 2.5; .5 MG/3ML; MG/3ML
1 SOLUTION RESPIRATORY (INHALATION)
Status: DISCONTINUED | OUTPATIENT
Start: 2024-03-24 | End: 2024-03-28

## 2024-03-24 RX ADMIN — WATER 1000 MG: 1 INJECTION INTRAMUSCULAR; INTRAVENOUS; SUBCUTANEOUS at 18:14

## 2024-03-24 RX ADMIN — AZITHROMYCIN DIHYDRATE 500 MG: 250 TABLET ORAL at 08:37

## 2024-03-24 RX ADMIN — Medication 1 G: at 18:14

## 2024-03-24 RX ADMIN — Medication 1 G: at 12:37

## 2024-03-24 RX ADMIN — IPRATROPIUM BROMIDE AND ALBUTEROL SULFATE 1 DOSE: 2.5; .5 SOLUTION RESPIRATORY (INHALATION) at 07:43

## 2024-03-24 RX ADMIN — ENOXAPARIN SODIUM 40 MG: 100 INJECTION SUBCUTANEOUS at 08:37

## 2024-03-24 RX ADMIN — Medication 30 G: at 08:38

## 2024-03-24 RX ADMIN — SODIUM CHLORIDE, PRESERVATIVE FREE 10 ML: 5 INJECTION INTRAVENOUS at 08:37

## 2024-03-24 RX ADMIN — Medication 400 MG: at 08:38

## 2024-03-24 RX ADMIN — Medication 1 G: at 08:37

## 2024-03-24 RX ADMIN — ONDANSETRON 4 MG: 2 INJECTION INTRAMUSCULAR; INTRAVENOUS at 18:42

## 2024-03-24 RX ADMIN — OLANZAPINE 2.5 MG: 5 TABLET, FILM COATED ORAL at 04:14

## 2024-03-24 RX ADMIN — SODIUM CHLORIDE, PRESERVATIVE FREE 10 ML: 5 INJECTION INTRAVENOUS at 22:53

## 2024-03-24 RX ADMIN — PANTOPRAZOLE SODIUM 40 MG: 40 TABLET, DELAYED RELEASE ORAL at 08:38

## 2024-03-24 NOTE — RT PROTOCOL NOTE
RT Inhaler-Nebulizer Bronchodilator Protocol Note    There is a bronchodilator order in the chart from a provider indicating to follow the RT Bronchodilator Protocol and there is an “Initiate RT Inhaler-Nebulizer Bronchodilator Protocol” order as well (see protocol at bottom of note).    CXR Findings:  XR CHEST PORTABLE    Result Date: 3/22/2024  Bibasilar airspace disease, left greater than right.  Follow-up is recommended to document resolution.       The findings from the last RT Protocol Assessment were as follows:   History Pulmonary Disease: Chronic pulmonary disease  Respiratory Pattern: Regular pattern and RR 12-20 bpm  Breath Sounds: Intermittent or unilateral wheezes  Cough: Strong, spontaneous, non-productive  Indication for Bronchodilator Therapy: Wheezing associated with pulm disorder  Bronchodilator Assessment Score: 6    Aerosolized bronchodilator medication orders have been revised according to the RT Inhaler-Nebulizer Bronchodilator Protocol below.    Respiratory Therapist to perform RT Therapy Protocol Assessment initially then follow the protocol.  Repeat RT Therapy Protocol Assessment PRN for score 0-3 or on second treatment, BID, and PRN for scores above 3.    No Indications - adjust the frequency to every 6 hours PRN wheezing or bronchospasm, if no treatments needed after 48 hours then discontinue using Per Protocol order mode.     If indication present, adjust the RT bronchodilator orders based on the Bronchodilator Assessment Score as indicated below.  Use Inhaler orders unless patient has one or more of the following: on home nebulizer, not able to hold breath for 10 seconds, is not alert and oriented, cannot activate and use MDI correctly, or respiratory rate 25 breaths per minute or more, then use the equivalent nebulizer order(s) with same Frequency and PRN reasons based on the score.  If a patient is on this medication at home then do not decrease Frequency below that used at

## 2024-03-25 PROBLEM — R33.9 URINARY RETENTION: Status: ACTIVE | Noted: 2024-03-25

## 2024-03-25 LAB
ANION GAP SERPL CALCULATED.3IONS-SCNC: 4 MMOL/L (ref 9–17)
BUN SERPL-MCNC: 38 MG/DL (ref 8–23)
BUN/CREAT SERPL: 48 (ref 9–20)
CALCIUM SERPL-MCNC: 8.7 MG/DL (ref 8.6–10.4)
CHLORIDE SERPL-SCNC: 84 MMOL/L (ref 98–107)
CO2 SERPL-SCNC: 42 MMOL/L (ref 20–31)
CREAT SERPL-MCNC: 0.8 MG/DL (ref 0.7–1.2)
GFR SERPL CREATININE-BSD FRML MDRD: >60 ML/MIN/1.73M2
GLUCOSE SERPL-MCNC: 208 MG/DL (ref 70–99)
MICROORGANISM SPEC CULT: ABNORMAL
POTASSIUM SERPL-SCNC: 4.5 MMOL/L (ref 3.7–5.3)
SODIUM SERPL-SCNC: 130 MMOL/L (ref 135–144)
SPECIMEN DESCRIPTION: ABNORMAL

## 2024-03-25 PROCEDURE — 36415 COLL VENOUS BLD VENIPUNCTURE: CPT

## 2024-03-25 PROCEDURE — 97116 GAIT TRAINING THERAPY: CPT

## 2024-03-25 PROCEDURE — 2060000000 HC ICU INTERMEDIATE R&B

## 2024-03-25 PROCEDURE — 6370000000 HC RX 637 (ALT 250 FOR IP): Performed by: INTERNAL MEDICINE

## 2024-03-25 PROCEDURE — 94761 N-INVAS EAR/PLS OXIMETRY MLT: CPT

## 2024-03-25 PROCEDURE — 6360000002 HC RX W HCPCS: Performed by: NURSE PRACTITIONER

## 2024-03-25 PROCEDURE — 94640 AIRWAY INHALATION TREATMENT: CPT

## 2024-03-25 PROCEDURE — 6370000000 HC RX 637 (ALT 250 FOR IP): Performed by: NURSE PRACTITIONER

## 2024-03-25 PROCEDURE — 99232 SBSQ HOSP IP/OBS MODERATE 35: CPT | Performed by: INTERNAL MEDICINE

## 2024-03-25 PROCEDURE — 97530 THERAPEUTIC ACTIVITIES: CPT

## 2024-03-25 PROCEDURE — 97110 THERAPEUTIC EXERCISES: CPT

## 2024-03-25 PROCEDURE — 2700000000 HC OXYGEN THERAPY PER DAY

## 2024-03-25 PROCEDURE — 80048 BASIC METABOLIC PNL TOTAL CA: CPT

## 2024-03-25 PROCEDURE — 97535 SELF CARE MNGMENT TRAINING: CPT

## 2024-03-25 PROCEDURE — 2580000003 HC RX 258: Performed by: NURSE PRACTITIONER

## 2024-03-25 RX ORDER — TAMSULOSIN HYDROCHLORIDE 0.4 MG/1
0.4 CAPSULE ORAL DAILY
Status: DISCONTINUED | OUTPATIENT
Start: 2024-03-25 | End: 2024-04-11 | Stop reason: HOSPADM

## 2024-03-25 RX ORDER — LEVOFLOXACIN 750 MG/1
750 TABLET, FILM COATED ORAL DAILY
Status: COMPLETED | OUTPATIENT
Start: 2024-03-25 | End: 2024-03-29

## 2024-03-25 RX ORDER — LORAZEPAM 2 MG/ML
1 INJECTION INTRAMUSCULAR EVERY 6 HOURS PRN
Status: DISCONTINUED | OUTPATIENT
Start: 2024-03-25 | End: 2024-03-26

## 2024-03-25 RX ADMIN — IPRATROPIUM BROMIDE AND ALBUTEROL SULFATE 1 DOSE: .5; 2.5 SOLUTION RESPIRATORY (INHALATION) at 07:52

## 2024-03-25 RX ADMIN — LEVOFLOXACIN 750 MG: 750 TABLET, FILM COATED ORAL at 11:09

## 2024-03-25 RX ADMIN — TAMSULOSIN HYDROCHLORIDE 0.4 MG: 0.4 CAPSULE ORAL at 14:46

## 2024-03-25 RX ADMIN — ENOXAPARIN SODIUM 40 MG: 100 INJECTION SUBCUTANEOUS at 09:05

## 2024-03-25 RX ADMIN — LORAZEPAM 1 MG: 2 INJECTION INTRAMUSCULAR; INTRAVENOUS at 00:21

## 2024-03-25 RX ADMIN — Medication 1 G: at 17:47

## 2024-03-25 RX ADMIN — Medication 30 G: at 14:43

## 2024-03-25 RX ADMIN — ONDANSETRON 4 MG: 2 INJECTION INTRAMUSCULAR; INTRAVENOUS at 18:08

## 2024-03-25 RX ADMIN — SODIUM CHLORIDE, PRESERVATIVE FREE 10 ML: 5 INJECTION INTRAVENOUS at 09:05

## 2024-03-25 ASSESSMENT — PAIN SCALES - WONG BAKER: WONGBAKER_NUMERICALRESPONSE: HURTS A LITTLE BIT

## 2024-03-26 LAB
ANION GAP SERPL CALCULATED.3IONS-SCNC: 4 MMOL/L (ref 9–17)
BUN SERPL-MCNC: 27 MG/DL (ref 8–23)
BUN/CREAT SERPL: 45 (ref 9–20)
CALCIUM SERPL-MCNC: 8.8 MG/DL (ref 8.6–10.4)
CHLORIDE SERPL-SCNC: 83 MMOL/L (ref 98–107)
CO2 SERPL-SCNC: 43 MMOL/L (ref 20–31)
CREAT SERPL-MCNC: 0.6 MG/DL (ref 0.7–1.2)
GFR SERPL CREATININE-BSD FRML MDRD: >90 ML/MIN/1.73M2
GLUCOSE SERPL-MCNC: 179 MG/DL (ref 70–99)
POTASSIUM SERPL-SCNC: 3.9 MMOL/L (ref 3.7–5.3)
SODIUM SERPL-SCNC: 130 MMOL/L (ref 135–144)

## 2024-03-26 PROCEDURE — 6370000000 HC RX 637 (ALT 250 FOR IP): Performed by: INTERNAL MEDICINE

## 2024-03-26 PROCEDURE — 99232 SBSQ HOSP IP/OBS MODERATE 35: CPT | Performed by: INTERNAL MEDICINE

## 2024-03-26 PROCEDURE — 36415 COLL VENOUS BLD VENIPUNCTURE: CPT

## 2024-03-26 PROCEDURE — 2580000003 HC RX 258: Performed by: NURSE PRACTITIONER

## 2024-03-26 PROCEDURE — 6370000000 HC RX 637 (ALT 250 FOR IP): Performed by: NURSE PRACTITIONER

## 2024-03-26 PROCEDURE — 2060000000 HC ICU INTERMEDIATE R&B

## 2024-03-26 PROCEDURE — 94761 N-INVAS EAR/PLS OXIMETRY MLT: CPT

## 2024-03-26 PROCEDURE — 80048 BASIC METABOLIC PNL TOTAL CA: CPT

## 2024-03-26 PROCEDURE — 6360000002 HC RX W HCPCS: Performed by: NURSE PRACTITIONER

## 2024-03-26 PROCEDURE — 99232 SBSQ HOSP IP/OBS MODERATE 35: CPT | Performed by: PSYCHIATRY & NEUROLOGY

## 2024-03-26 PROCEDURE — 2700000000 HC OXYGEN THERAPY PER DAY

## 2024-03-26 PROCEDURE — 6360000002 HC RX W HCPCS: Performed by: INTERNAL MEDICINE

## 2024-03-26 RX ORDER — METOPROLOL TARTRATE 50 MG/1
50 TABLET, FILM COATED ORAL 2 TIMES DAILY
Status: DISCONTINUED | OUTPATIENT
Start: 2024-03-26 | End: 2024-03-27

## 2024-03-26 RX ORDER — RISPERIDONE 0.25 MG/1
0.5 TABLET ORAL 2 TIMES DAILY PRN
Status: DISCONTINUED | OUTPATIENT
Start: 2024-03-26 | End: 2024-04-11 | Stop reason: HOSPADM

## 2024-03-26 RX ORDER — HYDRALAZINE HYDROCHLORIDE 20 MG/ML
10 INJECTION INTRAMUSCULAR; INTRAVENOUS EVERY 6 HOURS PRN
Status: DISCONTINUED | OUTPATIENT
Start: 2024-03-26 | End: 2024-04-01

## 2024-03-26 RX ORDER — MEMANTINE HYDROCHLORIDE 5 MG/1
5 TABLET ORAL 2 TIMES DAILY
Status: DISCONTINUED | OUTPATIENT
Start: 2024-03-26 | End: 2024-04-11 | Stop reason: HOSPADM

## 2024-03-26 RX ADMIN — METOPROLOL TARTRATE 50 MG: 50 TABLET, FILM COATED ORAL at 20:14

## 2024-03-26 RX ADMIN — TAMSULOSIN HYDROCHLORIDE 0.4 MG: 0.4 CAPSULE ORAL at 15:58

## 2024-03-26 RX ADMIN — PANTOPRAZOLE SODIUM 40 MG: 40 TABLET, DELAYED RELEASE ORAL at 15:58

## 2024-03-26 RX ADMIN — LORAZEPAM 1 MG: 2 INJECTION INTRAMUSCULAR; INTRAVENOUS at 04:57

## 2024-03-26 RX ADMIN — SODIUM CHLORIDE, PRESERVATIVE FREE 5 ML: 5 INJECTION INTRAVENOUS at 03:07

## 2024-03-26 RX ADMIN — SODIUM CHLORIDE, PRESERVATIVE FREE 10 ML: 5 INJECTION INTRAVENOUS at 16:18

## 2024-03-26 RX ADMIN — HYDRALAZINE HYDROCHLORIDE 10 MG: 20 INJECTION INTRAMUSCULAR; INTRAVENOUS at 16:54

## 2024-03-26 RX ADMIN — Medication 1 G: at 15:58

## 2024-03-26 RX ADMIN — SODIUM CHLORIDE, PRESERVATIVE FREE 10 ML: 5 INJECTION INTRAVENOUS at 20:14

## 2024-03-26 RX ADMIN — Medication 400 MG: at 15:58

## 2024-03-26 RX ADMIN — LEVOFLOXACIN 750 MG: 750 TABLET, FILM COATED ORAL at 15:58

## 2024-03-26 RX ADMIN — ONDANSETRON 4 MG: 2 INJECTION INTRAMUSCULAR; INTRAVENOUS at 16:18

## 2024-03-27 LAB
ANION GAP SERPL CALCULATED.3IONS-SCNC: 3 MMOL/L (ref 9–17)
BUN SERPL-MCNC: 14 MG/DL (ref 8–23)
BUN/CREAT SERPL: 28 (ref 9–20)
CALCIUM SERPL-MCNC: 9 MG/DL (ref 8.6–10.4)
CHLORIDE SERPL-SCNC: 85 MMOL/L (ref 98–107)
CO2 SERPL-SCNC: 42 MMOL/L (ref 20–31)
CREAT SERPL-MCNC: 0.5 MG/DL (ref 0.7–1.2)
GFR SERPL CREATININE-BSD FRML MDRD: >90 ML/MIN/1.73M2
GLUCOSE SERPL-MCNC: 115 MG/DL (ref 70–99)
MICROORGANISM SPEC CULT: NORMAL
MICROORGANISM SPEC CULT: NORMAL
POTASSIUM SERPL-SCNC: 4.2 MMOL/L (ref 3.7–5.3)
SERVICE CMNT-IMP: NORMAL
SERVICE CMNT-IMP: NORMAL
SODIUM SERPL-SCNC: 130 MMOL/L (ref 135–144)
SPECIMEN DESCRIPTION: NORMAL
SPECIMEN DESCRIPTION: NORMAL

## 2024-03-27 PROCEDURE — 94640 AIRWAY INHALATION TREATMENT: CPT

## 2024-03-27 PROCEDURE — 2580000003 HC RX 258: Performed by: NURSE PRACTITIONER

## 2024-03-27 PROCEDURE — 80048 BASIC METABOLIC PNL TOTAL CA: CPT

## 2024-03-27 PROCEDURE — 36415 COLL VENOUS BLD VENIPUNCTURE: CPT

## 2024-03-27 PROCEDURE — 6370000000 HC RX 637 (ALT 250 FOR IP): Performed by: INTERNAL MEDICINE

## 2024-03-27 PROCEDURE — 99232 SBSQ HOSP IP/OBS MODERATE 35: CPT | Performed by: INTERNAL MEDICINE

## 2024-03-27 PROCEDURE — 2700000000 HC OXYGEN THERAPY PER DAY

## 2024-03-27 PROCEDURE — 6370000000 HC RX 637 (ALT 250 FOR IP): Performed by: NURSE PRACTITIONER

## 2024-03-27 PROCEDURE — 6360000002 HC RX W HCPCS: Performed by: INTERNAL MEDICINE

## 2024-03-27 PROCEDURE — 2060000000 HC ICU INTERMEDIATE R&B

## 2024-03-27 PROCEDURE — 51798 US URINE CAPACITY MEASURE: CPT

## 2024-03-27 PROCEDURE — 6370000000 HC RX 637 (ALT 250 FOR IP): Performed by: PSYCHIATRY & NEUROLOGY

## 2024-03-27 PROCEDURE — 94761 N-INVAS EAR/PLS OXIMETRY MLT: CPT

## 2024-03-27 RX ORDER — SODIUM CHLORIDE 1 G/1
1 TABLET ORAL 2 TIMES DAILY WITH MEALS
Status: DISCONTINUED | OUTPATIENT
Start: 2024-03-28 | End: 2024-03-29

## 2024-03-27 RX ADMIN — Medication 1 G: at 10:44

## 2024-03-27 RX ADMIN — PANTOPRAZOLE SODIUM 40 MG: 40 TABLET, DELAYED RELEASE ORAL at 10:44

## 2024-03-27 RX ADMIN — MEMANTINE 5 MG: 5 TABLET ORAL at 19:30

## 2024-03-27 RX ADMIN — MEMANTINE 5 MG: 5 TABLET ORAL at 10:44

## 2024-03-27 RX ADMIN — SODIUM CHLORIDE, PRESERVATIVE FREE 10 ML: 5 INJECTION INTRAVENOUS at 19:34

## 2024-03-27 RX ADMIN — RISPERIDONE 0.5 MG: 0.25 TABLET, FILM COATED ORAL at 10:44

## 2024-03-27 RX ADMIN — METOPROLOL TARTRATE 75 MG: 50 TABLET, FILM COATED ORAL at 19:30

## 2024-03-27 RX ADMIN — RISPERIDONE 0.5 MG: 0.25 TABLET, FILM COATED ORAL at 19:30

## 2024-03-27 RX ADMIN — IPRATROPIUM BROMIDE AND ALBUTEROL SULFATE 1 DOSE: .5; 2.5 SOLUTION RESPIRATORY (INHALATION) at 08:18

## 2024-03-27 RX ADMIN — Medication 3 MG: at 19:30

## 2024-03-27 RX ADMIN — TAMSULOSIN HYDROCHLORIDE 0.4 MG: 0.4 CAPSULE ORAL at 10:44

## 2024-03-27 RX ADMIN — HYDRALAZINE HYDROCHLORIDE 10 MG: 20 INJECTION INTRAMUSCULAR; INTRAVENOUS at 10:39

## 2024-03-27 RX ADMIN — SODIUM CHLORIDE, PRESERVATIVE FREE 10 ML: 5 INJECTION INTRAVENOUS at 10:39

## 2024-03-27 RX ADMIN — METOPROLOL TARTRATE 50 MG: 50 TABLET, FILM COATED ORAL at 10:44

## 2024-03-27 RX ADMIN — Medication 400 MG: at 10:44

## 2024-03-27 RX ADMIN — LEVOFLOXACIN 750 MG: 750 TABLET, FILM COATED ORAL at 10:44

## 2024-03-28 LAB
ANION GAP SERPL CALCULATED.3IONS-SCNC: 5 MMOL/L (ref 9–17)
BUN SERPL-MCNC: 10 MG/DL (ref 8–23)
BUN/CREAT SERPL: 17 (ref 9–20)
CALCIUM SERPL-MCNC: 8.9 MG/DL (ref 8.6–10.4)
CHLORIDE SERPL-SCNC: 84 MMOL/L (ref 98–107)
CO2 SERPL-SCNC: 41 MMOL/L (ref 20–31)
CREAT SERPL-MCNC: 0.6 MG/DL (ref 0.7–1.2)
GFR SERPL CREATININE-BSD FRML MDRD: >90 ML/MIN/1.73M2
GLUCOSE BLD-MCNC: 130 MG/DL (ref 75–110)
GLUCOSE SERPL-MCNC: 121 MG/DL (ref 70–99)
POTASSIUM SERPL-SCNC: 4.4 MMOL/L (ref 3.7–5.3)
SODIUM SERPL-SCNC: 130 MMOL/L (ref 135–144)

## 2024-03-28 PROCEDURE — 97116 GAIT TRAINING THERAPY: CPT

## 2024-03-28 PROCEDURE — 1200000000 HC SEMI PRIVATE

## 2024-03-28 PROCEDURE — 2700000000 HC OXYGEN THERAPY PER DAY

## 2024-03-28 PROCEDURE — 6360000002 HC RX W HCPCS: Performed by: NURSE PRACTITIONER

## 2024-03-28 PROCEDURE — 36415 COLL VENOUS BLD VENIPUNCTURE: CPT

## 2024-03-28 PROCEDURE — 99232 SBSQ HOSP IP/OBS MODERATE 35: CPT | Performed by: INTERNAL MEDICINE

## 2024-03-28 PROCEDURE — 2580000003 HC RX 258: Performed by: NURSE PRACTITIONER

## 2024-03-28 PROCEDURE — 6370000000 HC RX 637 (ALT 250 FOR IP): Performed by: INTERNAL MEDICINE

## 2024-03-28 PROCEDURE — 94761 N-INVAS EAR/PLS OXIMETRY MLT: CPT

## 2024-03-28 PROCEDURE — 6370000000 HC RX 637 (ALT 250 FOR IP): Performed by: NURSE PRACTITIONER

## 2024-03-28 PROCEDURE — 6360000002 HC RX W HCPCS: Performed by: INTERNAL MEDICINE

## 2024-03-28 PROCEDURE — 6370000000 HC RX 637 (ALT 250 FOR IP): Performed by: PSYCHIATRY & NEUROLOGY

## 2024-03-28 PROCEDURE — 97535 SELF CARE MNGMENT TRAINING: CPT

## 2024-03-28 PROCEDURE — 82947 ASSAY GLUCOSE BLOOD QUANT: CPT

## 2024-03-28 PROCEDURE — 80048 BASIC METABOLIC PNL TOTAL CA: CPT

## 2024-03-28 RX ORDER — LORAZEPAM 2 MG/ML
1 INJECTION INTRAMUSCULAR ONCE
Status: DISCONTINUED | OUTPATIENT
Start: 2024-03-28 | End: 2024-03-28

## 2024-03-28 RX ADMIN — HYDRALAZINE HYDROCHLORIDE 10 MG: 20 INJECTION INTRAMUSCULAR; INTRAVENOUS at 04:18

## 2024-03-28 RX ADMIN — METOPROLOL TARTRATE 75 MG: 50 TABLET, FILM COATED ORAL at 21:09

## 2024-03-28 RX ADMIN — ENOXAPARIN SODIUM 40 MG: 100 INJECTION SUBCUTANEOUS at 09:48

## 2024-03-28 RX ADMIN — TAMSULOSIN HYDROCHLORIDE 0.4 MG: 0.4 CAPSULE ORAL at 09:50

## 2024-03-28 RX ADMIN — SODIUM CHLORIDE, PRESERVATIVE FREE 10 ML: 5 INJECTION INTRAVENOUS at 09:54

## 2024-03-28 RX ADMIN — ONDANSETRON 4 MG: 2 INJECTION INTRAMUSCULAR; INTRAVENOUS at 17:18

## 2024-03-28 RX ADMIN — Medication 30 G: at 09:55

## 2024-03-28 RX ADMIN — RISPERIDONE 0.5 MG: 0.25 TABLET, FILM COATED ORAL at 21:09

## 2024-03-28 RX ADMIN — METOPROLOL TARTRATE 75 MG: 50 TABLET, FILM COATED ORAL at 09:50

## 2024-03-28 RX ADMIN — Medication 1 G: at 17:06

## 2024-03-28 RX ADMIN — HYDRALAZINE HYDROCHLORIDE 10 MG: 20 INJECTION INTRAMUSCULAR; INTRAVENOUS at 18:12

## 2024-03-28 RX ADMIN — SODIUM CHLORIDE, PRESERVATIVE FREE 10 ML: 5 INJECTION INTRAVENOUS at 21:09

## 2024-03-28 RX ADMIN — Medication 1 G: at 09:50

## 2024-03-28 RX ADMIN — ACETAMINOPHEN 650 MG: 325 TABLET ORAL at 17:06

## 2024-03-28 RX ADMIN — MEMANTINE 5 MG: 5 TABLET ORAL at 09:50

## 2024-03-28 RX ADMIN — Medication 400 MG: at 09:50

## 2024-03-28 RX ADMIN — Medication 3 MG: at 21:09

## 2024-03-28 RX ADMIN — MEMANTINE 5 MG: 5 TABLET ORAL at 21:09

## 2024-03-28 RX ADMIN — LEVOFLOXACIN 750 MG: 750 TABLET, FILM COATED ORAL at 09:50

## 2024-03-28 RX ADMIN — PANTOPRAZOLE SODIUM 40 MG: 40 TABLET, DELAYED RELEASE ORAL at 09:50

## 2024-03-28 ASSESSMENT — PAIN - FUNCTIONAL ASSESSMENT: PAIN_FUNCTIONAL_ASSESSMENT: ACTIVITIES ARE NOT PREVENTED

## 2024-03-28 ASSESSMENT — PAIN SCALES - GENERAL: PAINLEVEL_OUTOF10: 3

## 2024-03-28 ASSESSMENT — PAIN DESCRIPTION - DESCRIPTORS: DESCRIPTORS: ACHING

## 2024-03-28 ASSESSMENT — PAIN DESCRIPTION - LOCATION: LOCATION: HEAD

## 2024-03-28 ASSESSMENT — PAIN DESCRIPTION - ORIENTATION: ORIENTATION: MID

## 2024-03-29 ENCOUNTER — APPOINTMENT (OUTPATIENT)
Dept: CT IMAGING | Age: 64
DRG: 640 | End: 2024-03-29
Payer: MEDICARE

## 2024-03-29 LAB
ANION GAP SERPL CALCULATED.3IONS-SCNC: 4 MMOL/L (ref 9–17)
ANION GAP SERPL CALCULATED.3IONS-SCNC: 4 MMOL/L (ref 9–17)
BUN SERPL-MCNC: 26 MG/DL (ref 8–23)
BUN SERPL-MCNC: 37 MG/DL (ref 8–23)
BUN/CREAT SERPL: 52 (ref 9–20)
BUN/CREAT SERPL: 53 (ref 9–20)
CALCIUM SERPL-MCNC: 8.7 MG/DL (ref 8.6–10.4)
CALCIUM SERPL-MCNC: 8.7 MG/DL (ref 8.6–10.4)
CHLORIDE SERPL-SCNC: 83 MMOL/L (ref 98–107)
CHLORIDE SERPL-SCNC: 86 MMOL/L (ref 98–107)
CHLORIDE UR-SCNC: 24 MMOL/L
CO2 SERPL-SCNC: 37 MMOL/L (ref 20–31)
CO2 SERPL-SCNC: 39 MMOL/L (ref 20–31)
CREAT SERPL-MCNC: 0.5 MG/DL (ref 0.7–1.2)
CREAT SERPL-MCNC: 0.7 MG/DL (ref 0.7–1.2)
GFR SERPL CREATININE-BSD FRML MDRD: >90 ML/MIN/1.73M2
GFR SERPL CREATININE-BSD FRML MDRD: >90 ML/MIN/1.73M2
GLUCOSE SERPL-MCNC: 118 MG/DL (ref 70–99)
GLUCOSE SERPL-MCNC: 119 MG/DL (ref 70–99)
HCO3 VENOUS: 36.3 MMOL/L (ref 22–29)
O2 SAT, VEN: 99.7 % (ref 60–85)
OSMOLALITY UR: 389 MOSM/KG (ref 80–1300)
PCO2, VEN: 49.9 MM HG (ref 41–51)
PH VENOUS: 7.47 (ref 7.32–7.43)
PO2, VEN: 186.8 MM HG (ref 30–50)
POSITIVE BASE EXCESS, VEN: 10.8 MMOL/L (ref 0–3)
POTASSIUM SERPL-SCNC: 3.7 MMOL/L (ref 3.7–5.3)
POTASSIUM SERPL-SCNC: 4.1 MMOL/L (ref 3.7–5.3)
POTASSIUM, UR: 27.6 MMOL/L
SODIUM SERPL-SCNC: 126 MMOL/L (ref 135–144)
SODIUM SERPL-SCNC: 127 MMOL/L (ref 135–144)
SODIUM UR-SCNC: 30 MMOL/L

## 2024-03-29 PROCEDURE — 80048 BASIC METABOLIC PNL TOTAL CA: CPT

## 2024-03-29 PROCEDURE — 6370000000 HC RX 637 (ALT 250 FOR IP): Performed by: INTERNAL MEDICINE

## 2024-03-29 PROCEDURE — 83935 ASSAY OF URINE OSMOLALITY: CPT

## 2024-03-29 PROCEDURE — 6370000000 HC RX 637 (ALT 250 FOR IP): Performed by: NURSE PRACTITIONER

## 2024-03-29 PROCEDURE — 84300 ASSAY OF URINE SODIUM: CPT

## 2024-03-29 PROCEDURE — 82088 ASSAY OF ALDOSTERONE: CPT

## 2024-03-29 PROCEDURE — 94761 N-INVAS EAR/PLS OXIMETRY MLT: CPT

## 2024-03-29 PROCEDURE — 99232 SBSQ HOSP IP/OBS MODERATE 35: CPT | Performed by: INTERNAL MEDICINE

## 2024-03-29 PROCEDURE — 84133 ASSAY OF URINE POTASSIUM: CPT

## 2024-03-29 PROCEDURE — 84244 ASSAY OF RENIN: CPT

## 2024-03-29 PROCEDURE — 82436 ASSAY OF URINE CHLORIDE: CPT

## 2024-03-29 PROCEDURE — 51798 US URINE CAPACITY MEASURE: CPT

## 2024-03-29 PROCEDURE — 2700000000 HC OXYGEN THERAPY PER DAY

## 2024-03-29 PROCEDURE — 2580000003 HC RX 258: Performed by: INTERNAL MEDICINE

## 2024-03-29 PROCEDURE — 6360000002 HC RX W HCPCS: Performed by: NURSE PRACTITIONER

## 2024-03-29 PROCEDURE — 2580000003 HC RX 258: Performed by: NURSE PRACTITIONER

## 2024-03-29 PROCEDURE — 36415 COLL VENOUS BLD VENIPUNCTURE: CPT

## 2024-03-29 PROCEDURE — 74176 CT ABD & PELVIS W/O CONTRAST: CPT

## 2024-03-29 PROCEDURE — 6370000000 HC RX 637 (ALT 250 FOR IP): Performed by: PSYCHIATRY & NEUROLOGY

## 2024-03-29 PROCEDURE — 1200000000 HC SEMI PRIVATE

## 2024-03-29 PROCEDURE — 82803 BLOOD GASES ANY COMBINATION: CPT

## 2024-03-29 RX ORDER — QUETIAPINE FUMARATE 25 MG/1
25 TABLET, FILM COATED ORAL NIGHTLY
Status: DISCONTINUED | OUTPATIENT
Start: 2024-03-29 | End: 2024-04-06

## 2024-03-29 RX ORDER — SODIUM CHLORIDE 9 MG/ML
INJECTION, SOLUTION INTRAVENOUS CONTINUOUS
Status: DISCONTINUED | OUTPATIENT
Start: 2024-03-29 | End: 2024-03-31

## 2024-03-29 RX ADMIN — Medication 30 G: at 08:46

## 2024-03-29 RX ADMIN — ENOXAPARIN SODIUM 40 MG: 100 INJECTION SUBCUTANEOUS at 08:51

## 2024-03-29 RX ADMIN — MEMANTINE 5 MG: 5 TABLET ORAL at 19:57

## 2024-03-29 RX ADMIN — MEMANTINE 5 MG: 5 TABLET ORAL at 08:45

## 2024-03-29 RX ADMIN — METOPROLOL TARTRATE 75 MG: 50 TABLET, FILM COATED ORAL at 19:57

## 2024-03-29 RX ADMIN — SODIUM CHLORIDE, PRESERVATIVE FREE 10 ML: 5 INJECTION INTRAVENOUS at 19:58

## 2024-03-29 RX ADMIN — TAMSULOSIN HYDROCHLORIDE 0.4 MG: 0.4 CAPSULE ORAL at 08:45

## 2024-03-29 RX ADMIN — LEVOFLOXACIN 750 MG: 750 TABLET, FILM COATED ORAL at 08:45

## 2024-03-29 RX ADMIN — SODIUM CHLORIDE, PRESERVATIVE FREE 10 ML: 5 INJECTION INTRAVENOUS at 08:48

## 2024-03-29 RX ADMIN — QUETIAPINE FUMARATE 25 MG: 25 TABLET ORAL at 19:57

## 2024-03-29 RX ADMIN — Medication 3 MG: at 19:57

## 2024-03-29 RX ADMIN — RISPERIDONE 0.5 MG: 0.25 TABLET, FILM COATED ORAL at 19:57

## 2024-03-29 RX ADMIN — Medication 400 MG: at 08:45

## 2024-03-29 RX ADMIN — METOPROLOL TARTRATE 75 MG: 50 TABLET, FILM COATED ORAL at 08:45

## 2024-03-29 RX ADMIN — SODIUM CHLORIDE: 9 INJECTION, SOLUTION INTRAVENOUS at 20:08

## 2024-03-30 LAB
ANION GAP SERPL CALCULATED.3IONS-SCNC: 3 MMOL/L (ref 9–17)
ANION GAP SERPL CALCULATED.3IONS-SCNC: 4 MMOL/L (ref 9–17)
ANION GAP SERPL CALCULATED.3IONS-SCNC: 4 MMOL/L (ref 9–17)
ANION GAP SERPL CALCULATED.3IONS-SCNC: 5 MMOL/L (ref 9–17)
BUN SERPL-MCNC: 19 MG/DL (ref 8–23)
BUN SERPL-MCNC: 24 MG/DL (ref 8–23)
BUN SERPL-MCNC: 32 MG/DL (ref 8–23)
BUN SERPL-MCNC: 49 MG/DL (ref 8–23)
BUN/CREAT SERPL: 38 (ref 9–20)
BUN/CREAT SERPL: 48 (ref 9–20)
BUN/CREAT SERPL: 64 (ref 9–20)
BUN/CREAT SERPL: 82 (ref 9–20)
CALCIUM SERPL-MCNC: 8 MG/DL (ref 8.6–10.4)
CALCIUM SERPL-MCNC: 8.5 MG/DL (ref 8.6–10.4)
CALCIUM SERPL-MCNC: 8.5 MG/DL (ref 8.6–10.4)
CALCIUM SERPL-MCNC: 8.8 MG/DL (ref 8.6–10.4)
CHLORIDE SERPL-SCNC: 86 MMOL/L (ref 98–107)
CHLORIDE SERPL-SCNC: 89 MMOL/L (ref 98–107)
CO2 SERPL-SCNC: 37 MMOL/L (ref 20–31)
CO2 SERPL-SCNC: 37 MMOL/L (ref 20–31)
CO2 SERPL-SCNC: 38 MMOL/L (ref 20–31)
CO2 SERPL-SCNC: 38 MMOL/L (ref 20–31)
CREAT SERPL-MCNC: 0.5 MG/DL (ref 0.7–1.2)
CREAT SERPL-MCNC: 0.6 MG/DL (ref 0.7–1.2)
GFR SERPL CREATININE-BSD FRML MDRD: >90 ML/MIN/1.73M2
GLUCOSE SERPL-MCNC: 111 MG/DL (ref 70–99)
GLUCOSE SERPL-MCNC: 113 MG/DL (ref 70–99)
GLUCOSE SERPL-MCNC: 114 MG/DL (ref 70–99)
GLUCOSE SERPL-MCNC: 153 MG/DL (ref 70–99)
POTASSIUM SERPL-SCNC: 3.5 MMOL/L (ref 3.7–5.3)
POTASSIUM SERPL-SCNC: 3.7 MMOL/L (ref 3.7–5.3)
POTASSIUM SERPL-SCNC: 4.1 MMOL/L (ref 3.7–5.3)
POTASSIUM SERPL-SCNC: 4.4 MMOL/L (ref 3.7–5.3)
SODIUM SERPL-SCNC: 127 MMOL/L (ref 135–144)
SODIUM SERPL-SCNC: 128 MMOL/L (ref 135–144)
SODIUM SERPL-SCNC: 128 MMOL/L (ref 135–144)
SODIUM SERPL-SCNC: 130 MMOL/L (ref 135–144)

## 2024-03-30 PROCEDURE — 2580000003 HC RX 258: Performed by: NURSE PRACTITIONER

## 2024-03-30 PROCEDURE — 99232 SBSQ HOSP IP/OBS MODERATE 35: CPT | Performed by: INTERNAL MEDICINE

## 2024-03-30 PROCEDURE — 6370000000 HC RX 637 (ALT 250 FOR IP): Performed by: INTERNAL MEDICINE

## 2024-03-30 PROCEDURE — 80048 BASIC METABOLIC PNL TOTAL CA: CPT

## 2024-03-30 PROCEDURE — 2700000000 HC OXYGEN THERAPY PER DAY

## 2024-03-30 PROCEDURE — 6370000000 HC RX 637 (ALT 250 FOR IP): Performed by: PSYCHIATRY & NEUROLOGY

## 2024-03-30 PROCEDURE — 1200000000 HC SEMI PRIVATE

## 2024-03-30 PROCEDURE — 6370000000 HC RX 637 (ALT 250 FOR IP): Performed by: NURSE PRACTITIONER

## 2024-03-30 PROCEDURE — 36415 COLL VENOUS BLD VENIPUNCTURE: CPT

## 2024-03-30 PROCEDURE — 6360000002 HC RX W HCPCS: Performed by: INTERNAL MEDICINE

## 2024-03-30 PROCEDURE — 97530 THERAPEUTIC ACTIVITIES: CPT

## 2024-03-30 PROCEDURE — 97116 GAIT TRAINING THERAPY: CPT

## 2024-03-30 PROCEDURE — 97535 SELF CARE MNGMENT TRAINING: CPT

## 2024-03-30 PROCEDURE — 6360000002 HC RX W HCPCS: Performed by: NURSE PRACTITIONER

## 2024-03-30 PROCEDURE — 94761 N-INVAS EAR/PLS OXIMETRY MLT: CPT

## 2024-03-30 RX ORDER — METOPROLOL TARTRATE 100 MG/1
100 TABLET ORAL 2 TIMES DAILY
Status: DISCONTINUED | OUTPATIENT
Start: 2024-03-30 | End: 2024-04-04

## 2024-03-30 RX ADMIN — SODIUM CHLORIDE, PRESERVATIVE FREE 10 ML: 5 INJECTION INTRAVENOUS at 21:31

## 2024-03-30 RX ADMIN — Medication 3 MG: at 21:30

## 2024-03-30 RX ADMIN — ENOXAPARIN SODIUM 40 MG: 100 INJECTION SUBCUTANEOUS at 09:09

## 2024-03-30 RX ADMIN — MEMANTINE 5 MG: 5 TABLET ORAL at 21:30

## 2024-03-30 RX ADMIN — Medication 400 MG: at 09:09

## 2024-03-30 RX ADMIN — QUETIAPINE FUMARATE 25 MG: 25 TABLET ORAL at 21:31

## 2024-03-30 RX ADMIN — RISPERIDONE 0.5 MG: 0.25 TABLET, FILM COATED ORAL at 21:31

## 2024-03-30 RX ADMIN — Medication 30 G: at 09:09

## 2024-03-30 RX ADMIN — TAMSULOSIN HYDROCHLORIDE 0.4 MG: 0.4 CAPSULE ORAL at 09:09

## 2024-03-30 RX ADMIN — SODIUM CHLORIDE, PRESERVATIVE FREE 10 ML: 5 INJECTION INTRAVENOUS at 09:10

## 2024-03-30 RX ADMIN — MEMANTINE 5 MG: 5 TABLET ORAL at 09:09

## 2024-03-30 RX ADMIN — METOPROLOL 100 MG: 100 TABLET ORAL at 21:30

## 2024-03-30 RX ADMIN — HYDRALAZINE HYDROCHLORIDE 10 MG: 20 INJECTION INTRAMUSCULAR; INTRAVENOUS at 00:15

## 2024-03-30 RX ADMIN — METOPROLOL TARTRATE 75 MG: 50 TABLET, FILM COATED ORAL at 09:09

## 2024-03-31 LAB
ALDOST SERPL-MCNC: 5.2 NG/DL
ALDOSTERONE COMMENT: NORMAL
ANION GAP SERPL CALCULATED.3IONS-SCNC: 3 MMOL/L (ref 9–17)
ANION GAP SERPL CALCULATED.3IONS-SCNC: 4 MMOL/L (ref 9–17)
BUN SERPL-MCNC: 19 MG/DL (ref 8–23)
BUN SERPL-MCNC: 23 MG/DL (ref 8–23)
BUN/CREAT SERPL: 38 (ref 9–20)
BUN/CREAT SERPL: 38 (ref 9–20)
CALCIUM SERPL-MCNC: 8.1 MG/DL (ref 8.6–10.4)
CALCIUM SERPL-MCNC: 8.3 MG/DL (ref 8.6–10.4)
CHLORIDE SERPL-SCNC: 85 MMOL/L (ref 98–107)
CHLORIDE SERPL-SCNC: 88 MMOL/L (ref 98–107)
CO2 SERPL-SCNC: 35 MMOL/L (ref 20–31)
CO2 SERPL-SCNC: 37 MMOL/L (ref 20–31)
CREAT SERPL-MCNC: 0.5 MG/DL (ref 0.7–1.2)
CREAT SERPL-MCNC: 0.6 MG/DL (ref 0.7–1.2)
GFR SERPL CREATININE-BSD FRML MDRD: >90 ML/MIN/1.73M2
GFR SERPL CREATININE-BSD FRML MDRD: >90 ML/MIN/1.73M2
GLUCOSE SERPL-MCNC: 106 MG/DL (ref 70–99)
GLUCOSE SERPL-MCNC: 142 MG/DL (ref 70–99)
POTASSIUM SERPL-SCNC: 3.7 MMOL/L (ref 3.7–5.3)
POTASSIUM SERPL-SCNC: 4.1 MMOL/L (ref 3.7–5.3)
SODIUM SERPL-SCNC: 125 MMOL/L (ref 135–144)
SODIUM SERPL-SCNC: 127 MMOL/L (ref 135–144)

## 2024-03-31 PROCEDURE — 6370000000 HC RX 637 (ALT 250 FOR IP): Performed by: NURSE PRACTITIONER

## 2024-03-31 PROCEDURE — 80048 BASIC METABOLIC PNL TOTAL CA: CPT

## 2024-03-31 PROCEDURE — 2580000003 HC RX 258: Performed by: NURSE PRACTITIONER

## 2024-03-31 PROCEDURE — 6360000002 HC RX W HCPCS: Performed by: NURSE PRACTITIONER

## 2024-03-31 PROCEDURE — 99232 SBSQ HOSP IP/OBS MODERATE 35: CPT | Performed by: INTERNAL MEDICINE

## 2024-03-31 PROCEDURE — 6370000000 HC RX 637 (ALT 250 FOR IP): Performed by: INTERNAL MEDICINE

## 2024-03-31 PROCEDURE — 6370000000 HC RX 637 (ALT 250 FOR IP): Performed by: PSYCHIATRY & NEUROLOGY

## 2024-03-31 PROCEDURE — 36415 COLL VENOUS BLD VENIPUNCTURE: CPT

## 2024-03-31 PROCEDURE — 1200000000 HC SEMI PRIVATE

## 2024-03-31 RX ORDER — AMLODIPINE BESYLATE 5 MG/1
5 TABLET ORAL DAILY
Status: DISCONTINUED | OUTPATIENT
Start: 2024-03-31 | End: 2024-04-01

## 2024-03-31 RX ADMIN — SODIUM CHLORIDE, PRESERVATIVE FREE 10 ML: 5 INJECTION INTRAVENOUS at 20:18

## 2024-03-31 RX ADMIN — SODIUM CHLORIDE, PRESERVATIVE FREE 10 ML: 5 INJECTION INTRAVENOUS at 09:58

## 2024-03-31 RX ADMIN — QUETIAPINE FUMARATE 25 MG: 25 TABLET ORAL at 20:17

## 2024-03-31 RX ADMIN — MEMANTINE 5 MG: 5 TABLET ORAL at 20:17

## 2024-03-31 RX ADMIN — Medication 400 MG: at 09:57

## 2024-03-31 RX ADMIN — RISPERIDONE 0.5 MG: 0.25 TABLET, FILM COATED ORAL at 20:17

## 2024-03-31 RX ADMIN — METOPROLOL 100 MG: 100 TABLET ORAL at 20:17

## 2024-03-31 RX ADMIN — MEMANTINE 5 MG: 5 TABLET ORAL at 09:57

## 2024-03-31 RX ADMIN — METOPROLOL 100 MG: 100 TABLET ORAL at 09:57

## 2024-03-31 RX ADMIN — ENOXAPARIN SODIUM 40 MG: 100 INJECTION SUBCUTANEOUS at 09:57

## 2024-03-31 RX ADMIN — PANTOPRAZOLE SODIUM 40 MG: 40 TABLET, DELAYED RELEASE ORAL at 05:02

## 2024-03-31 RX ADMIN — Medication 3 MG: at 20:17

## 2024-03-31 RX ADMIN — TAMSULOSIN HYDROCHLORIDE 0.4 MG: 0.4 CAPSULE ORAL at 09:57

## 2024-03-31 RX ADMIN — AMLODIPINE BESYLATE 5 MG: 5 TABLET ORAL at 13:43

## 2024-04-01 LAB
ANION GAP SERPL CALCULATED.3IONS-SCNC: 3 MMOL/L (ref 9–17)
BUN SERPL-MCNC: 17 MG/DL (ref 8–23)
BUN/CREAT SERPL: 34 (ref 9–20)
CALCIUM SERPL-MCNC: 8.4 MG/DL (ref 8.6–10.4)
CHLORIDE SERPL-SCNC: 90 MMOL/L (ref 98–107)
CO2 SERPL-SCNC: 36 MMOL/L (ref 20–31)
CREAT SERPL-MCNC: 0.5 MG/DL (ref 0.7–1.2)
GFR SERPL CREATININE-BSD FRML MDRD: >90 ML/MIN/1.73M2
GLUCOSE SERPL-MCNC: 133 MG/DL (ref 70–99)
POTASSIUM SERPL-SCNC: 3.7 MMOL/L (ref 3.7–5.3)
RENIN COMMENT: NORMAL
RENIN PLAS-CCNC: 0.4 NG/ML/HR
SODIUM SERPL-SCNC: 129 MMOL/L (ref 135–144)

## 2024-04-01 PROCEDURE — 6370000000 HC RX 637 (ALT 250 FOR IP): Performed by: INTERNAL MEDICINE

## 2024-04-01 PROCEDURE — 6370000000 HC RX 637 (ALT 250 FOR IP): Performed by: NURSE PRACTITIONER

## 2024-04-01 PROCEDURE — 2700000000 HC OXYGEN THERAPY PER DAY

## 2024-04-01 PROCEDURE — 97530 THERAPEUTIC ACTIVITIES: CPT

## 2024-04-01 PROCEDURE — 1200000000 HC SEMI PRIVATE

## 2024-04-01 PROCEDURE — 99232 SBSQ HOSP IP/OBS MODERATE 35: CPT | Performed by: INTERNAL MEDICINE

## 2024-04-01 PROCEDURE — 6360000002 HC RX W HCPCS: Performed by: NURSE PRACTITIONER

## 2024-04-01 PROCEDURE — 36415 COLL VENOUS BLD VENIPUNCTURE: CPT

## 2024-04-01 PROCEDURE — 2580000003 HC RX 258: Performed by: NURSE PRACTITIONER

## 2024-04-01 PROCEDURE — 80048 BASIC METABOLIC PNL TOTAL CA: CPT

## 2024-04-01 PROCEDURE — 94760 N-INVAS EAR/PLS OXIMETRY 1: CPT

## 2024-04-01 PROCEDURE — 6370000000 HC RX 637 (ALT 250 FOR IP): Performed by: PSYCHIATRY & NEUROLOGY

## 2024-04-01 RX ORDER — AMLODIPINE BESYLATE 10 MG/1
10 TABLET ORAL DAILY
Status: DISCONTINUED | OUTPATIENT
Start: 2024-04-02 | End: 2024-04-08

## 2024-04-01 RX ADMIN — ENOXAPARIN SODIUM 40 MG: 100 INJECTION SUBCUTANEOUS at 08:07

## 2024-04-01 RX ADMIN — QUETIAPINE FUMARATE 25 MG: 25 TABLET ORAL at 21:48

## 2024-04-01 RX ADMIN — Medication 400 MG: at 08:07

## 2024-04-01 RX ADMIN — MEMANTINE 5 MG: 5 TABLET ORAL at 21:48

## 2024-04-01 RX ADMIN — SODIUM CHLORIDE, PRESERVATIVE FREE 10 ML: 5 INJECTION INTRAVENOUS at 08:08

## 2024-04-01 RX ADMIN — TAMSULOSIN HYDROCHLORIDE 0.4 MG: 0.4 CAPSULE ORAL at 08:07

## 2024-04-01 RX ADMIN — Medication 30 G: at 08:08

## 2024-04-01 RX ADMIN — ONDANSETRON 4 MG: 2 INJECTION INTRAMUSCULAR; INTRAVENOUS at 12:15

## 2024-04-01 RX ADMIN — METOPROLOL 100 MG: 100 TABLET ORAL at 08:07

## 2024-04-01 RX ADMIN — MEMANTINE 5 MG: 5 TABLET ORAL at 08:07

## 2024-04-01 RX ADMIN — SODIUM CHLORIDE, PRESERVATIVE FREE 10 ML: 5 INJECTION INTRAVENOUS at 20:29

## 2024-04-01 RX ADMIN — METOPROLOL 100 MG: 100 TABLET ORAL at 20:29

## 2024-04-01 RX ADMIN — AMLODIPINE BESYLATE 5 MG: 5 TABLET ORAL at 08:07

## 2024-04-01 RX ADMIN — ONDANSETRON 4 MG: 2 INJECTION INTRAMUSCULAR; INTRAVENOUS at 20:28

## 2024-04-01 RX ADMIN — ACETAMINOPHEN 650 MG: 325 TABLET ORAL at 06:41

## 2024-04-01 ASSESSMENT — PAIN SCALES - GENERAL: PAINLEVEL_OUTOF10: 3

## 2024-04-01 ASSESSMENT — PAIN - FUNCTIONAL ASSESSMENT: PAIN_FUNCTIONAL_ASSESSMENT: ACTIVITIES ARE NOT PREVENTED

## 2024-04-01 ASSESSMENT — PAIN DESCRIPTION - DESCRIPTORS: DESCRIPTORS: ACHING

## 2024-04-01 ASSESSMENT — PAIN DESCRIPTION - LOCATION: LOCATION: HEAD

## 2024-04-02 LAB
ANION GAP SERPL CALCULATED.3IONS-SCNC: 3 MMOL/L (ref 9–17)
BUN SERPL-MCNC: 17 MG/DL (ref 8–23)
BUN/CREAT SERPL: 28 (ref 9–20)
CALCIUM SERPL-MCNC: 8.5 MG/DL (ref 8.6–10.4)
CHLORIDE SERPL-SCNC: 91 MMOL/L (ref 98–107)
CO2 SERPL-SCNC: 40 MMOL/L (ref 20–31)
CREAT SERPL-MCNC: 0.6 MG/DL (ref 0.7–1.2)
GFR SERPL CREATININE-BSD FRML MDRD: >90 ML/MIN/1.73M2
GLUCOSE SERPL-MCNC: 107 MG/DL (ref 70–99)
POTASSIUM SERPL-SCNC: 3.9 MMOL/L (ref 3.7–5.3)
SODIUM SERPL-SCNC: 134 MMOL/L (ref 135–144)

## 2024-04-02 PROCEDURE — 97116 GAIT TRAINING THERAPY: CPT

## 2024-04-02 PROCEDURE — 80048 BASIC METABOLIC PNL TOTAL CA: CPT

## 2024-04-02 PROCEDURE — 6370000000 HC RX 637 (ALT 250 FOR IP): Performed by: NURSE PRACTITIONER

## 2024-04-02 PROCEDURE — 6370000000 HC RX 637 (ALT 250 FOR IP): Performed by: INTERNAL MEDICINE

## 2024-04-02 PROCEDURE — 99232 SBSQ HOSP IP/OBS MODERATE 35: CPT | Performed by: FAMILY MEDICINE

## 2024-04-02 PROCEDURE — 97530 THERAPEUTIC ACTIVITIES: CPT

## 2024-04-02 PROCEDURE — 6370000000 HC RX 637 (ALT 250 FOR IP): Performed by: PSYCHIATRY & NEUROLOGY

## 2024-04-02 PROCEDURE — 36415 COLL VENOUS BLD VENIPUNCTURE: CPT

## 2024-04-02 PROCEDURE — 1200000000 HC SEMI PRIVATE

## 2024-04-02 PROCEDURE — 2580000003 HC RX 258: Performed by: NURSE PRACTITIONER

## 2024-04-02 PROCEDURE — 6360000002 HC RX W HCPCS: Performed by: NURSE PRACTITIONER

## 2024-04-02 RX ORDER — LISINOPRIL 5 MG/1
5 TABLET ORAL DAILY
Status: DISCONTINUED | OUTPATIENT
Start: 2024-04-02 | End: 2024-04-03

## 2024-04-02 RX ADMIN — MEMANTINE 5 MG: 5 TABLET ORAL at 08:51

## 2024-04-02 RX ADMIN — METOPROLOL 100 MG: 100 TABLET ORAL at 08:51

## 2024-04-02 RX ADMIN — Medication 400 MG: at 08:51

## 2024-04-02 RX ADMIN — QUETIAPINE FUMARATE 25 MG: 25 TABLET ORAL at 20:10

## 2024-04-02 RX ADMIN — AMLODIPINE BESYLATE 10 MG: 10 TABLET ORAL at 08:51

## 2024-04-02 RX ADMIN — TAMSULOSIN HYDROCHLORIDE 0.4 MG: 0.4 CAPSULE ORAL at 08:51

## 2024-04-02 RX ADMIN — MEMANTINE 5 MG: 5 TABLET ORAL at 20:10

## 2024-04-02 RX ADMIN — SODIUM CHLORIDE, PRESERVATIVE FREE 10 ML: 5 INJECTION INTRAVENOUS at 08:53

## 2024-04-02 RX ADMIN — ENOXAPARIN SODIUM 40 MG: 100 INJECTION SUBCUTANEOUS at 08:51

## 2024-04-02 RX ADMIN — LISINOPRIL 5 MG: 5 TABLET ORAL at 10:23

## 2024-04-02 RX ADMIN — Medication 30 G: at 08:56

## 2024-04-02 RX ADMIN — PANTOPRAZOLE SODIUM 40 MG: 40 TABLET, DELAYED RELEASE ORAL at 05:19

## 2024-04-02 RX ADMIN — ACETAMINOPHEN 650 MG: 325 TABLET ORAL at 19:07

## 2024-04-02 RX ADMIN — METOPROLOL 100 MG: 100 TABLET ORAL at 20:10

## 2024-04-02 RX ADMIN — SODIUM CHLORIDE, PRESERVATIVE FREE 10 ML: 5 INJECTION INTRAVENOUS at 20:11

## 2024-04-02 ASSESSMENT — PAIN DESCRIPTION - LOCATION: LOCATION: HEAD

## 2024-04-02 ASSESSMENT — PAIN SCALES - GENERAL
PAINLEVEL_OUTOF10: 0
PAINLEVEL_OUTOF10: 3

## 2024-04-02 ASSESSMENT — PAIN DESCRIPTION - DESCRIPTORS: DESCRIPTORS: ACHING

## 2024-04-03 LAB
ANION GAP SERPL CALCULATED.3IONS-SCNC: 4 MMOL/L (ref 9–17)
BUN SERPL-MCNC: 19 MG/DL (ref 8–23)
BUN/CREAT SERPL: 48 (ref 9–20)
CALCIUM SERPL-MCNC: 8.6 MG/DL (ref 8.6–10.4)
CHLORIDE SERPL-SCNC: 90 MMOL/L (ref 98–107)
CO2 SERPL-SCNC: 38 MMOL/L (ref 20–31)
CREAT SERPL-MCNC: 0.4 MG/DL (ref 0.7–1.2)
GFR SERPL CREATININE-BSD FRML MDRD: >90 ML/MIN/1.73M2
GLUCOSE SERPL-MCNC: 96 MG/DL (ref 70–99)
POTASSIUM SERPL-SCNC: 4 MMOL/L (ref 3.7–5.3)
SODIUM SERPL-SCNC: 132 MMOL/L (ref 135–144)

## 2024-04-03 PROCEDURE — 94760 N-INVAS EAR/PLS OXIMETRY 1: CPT

## 2024-04-03 PROCEDURE — 6360000002 HC RX W HCPCS: Performed by: NURSE PRACTITIONER

## 2024-04-03 PROCEDURE — 6360000002 HC RX W HCPCS: Performed by: INTERNAL MEDICINE

## 2024-04-03 PROCEDURE — 36415 COLL VENOUS BLD VENIPUNCTURE: CPT

## 2024-04-03 PROCEDURE — 2580000003 HC RX 258: Performed by: NURSE PRACTITIONER

## 2024-04-03 PROCEDURE — 1200000000 HC SEMI PRIVATE

## 2024-04-03 PROCEDURE — 6370000000 HC RX 637 (ALT 250 FOR IP): Performed by: NURSE PRACTITIONER

## 2024-04-03 PROCEDURE — 80048 BASIC METABOLIC PNL TOTAL CA: CPT

## 2024-04-03 PROCEDURE — 6370000000 HC RX 637 (ALT 250 FOR IP): Performed by: INTERNAL MEDICINE

## 2024-04-03 PROCEDURE — 2700000000 HC OXYGEN THERAPY PER DAY

## 2024-04-03 PROCEDURE — 6370000000 HC RX 637 (ALT 250 FOR IP): Performed by: PSYCHIATRY & NEUROLOGY

## 2024-04-03 PROCEDURE — 99231 SBSQ HOSP IP/OBS SF/LOW 25: CPT | Performed by: FAMILY MEDICINE

## 2024-04-03 PROCEDURE — 97530 THERAPEUTIC ACTIVITIES: CPT

## 2024-04-03 RX ORDER — DIPHENHYDRAMINE HYDROCHLORIDE 50 MG/ML
12.5 INJECTION INTRAMUSCULAR; INTRAVENOUS EVERY 6 HOURS PRN
Status: DISCONTINUED | OUTPATIENT
Start: 2024-04-03 | End: 2024-04-04

## 2024-04-03 RX ORDER — LORAZEPAM 2 MG/ML
2 INJECTION INTRAMUSCULAR EVERY 4 HOURS PRN
Status: DISCONTINUED | OUTPATIENT
Start: 2024-04-03 | End: 2024-04-04

## 2024-04-03 RX ORDER — HALOPERIDOL 5 MG/ML
2 INJECTION INTRAMUSCULAR EVERY 4 HOURS PRN
Status: DISCONTINUED | OUTPATIENT
Start: 2024-04-03 | End: 2024-04-04

## 2024-04-03 RX ORDER — LISINOPRIL 10 MG/1
10 TABLET ORAL DAILY
Status: DISCONTINUED | OUTPATIENT
Start: 2024-04-04 | End: 2024-04-04

## 2024-04-03 RX ADMIN — MEMANTINE 5 MG: 5 TABLET ORAL at 08:05

## 2024-04-03 RX ADMIN — HALOPERIDOL LACTATE 2 MG: 5 INJECTION, SOLUTION INTRAMUSCULAR at 22:21

## 2024-04-03 RX ADMIN — Medication 3 MG: at 21:04

## 2024-04-03 RX ADMIN — SODIUM CHLORIDE, PRESERVATIVE FREE 10 ML: 5 INJECTION INTRAVENOUS at 08:07

## 2024-04-03 RX ADMIN — MEMANTINE 5 MG: 5 TABLET ORAL at 20:26

## 2024-04-03 RX ADMIN — SODIUM CHLORIDE, PRESERVATIVE FREE 10 ML: 5 INJECTION INTRAVENOUS at 20:29

## 2024-04-03 RX ADMIN — ENOXAPARIN SODIUM 40 MG: 100 INJECTION SUBCUTANEOUS at 08:05

## 2024-04-03 RX ADMIN — METOPROLOL 100 MG: 100 TABLET ORAL at 20:26

## 2024-04-03 RX ADMIN — ONDANSETRON 4 MG: 4 TABLET, ORALLY DISINTEGRATING ORAL at 08:01

## 2024-04-03 RX ADMIN — RISPERIDONE 0.5 MG: 0.25 TABLET, FILM COATED ORAL at 20:26

## 2024-04-03 RX ADMIN — Medication 30 G: at 08:06

## 2024-04-03 RX ADMIN — LISINOPRIL 5 MG: 5 TABLET ORAL at 08:05

## 2024-04-03 RX ADMIN — LORAZEPAM 2 MG: 2 INJECTION INTRAMUSCULAR; INTRAVENOUS at 23:04

## 2024-04-03 RX ADMIN — METOPROLOL 100 MG: 100 TABLET ORAL at 08:05

## 2024-04-03 RX ADMIN — ONDANSETRON 4 MG: 2 INJECTION INTRAMUSCULAR; INTRAVENOUS at 01:49

## 2024-04-03 RX ADMIN — AMLODIPINE BESYLATE 10 MG: 10 TABLET ORAL at 08:05

## 2024-04-03 RX ADMIN — QUETIAPINE FUMARATE 25 MG: 25 TABLET ORAL at 20:26

## 2024-04-03 RX ADMIN — Medication 400 MG: at 08:05

## 2024-04-03 RX ADMIN — TAMSULOSIN HYDROCHLORIDE 0.4 MG: 0.4 CAPSULE ORAL at 08:05

## 2024-04-03 RX ADMIN — PANTOPRAZOLE SODIUM 40 MG: 40 TABLET, DELAYED RELEASE ORAL at 06:27

## 2024-04-03 ASSESSMENT — PAIN SCALES - GENERAL
PAINLEVEL_OUTOF10: 0

## 2024-04-04 PROBLEM — F02.811: Status: ACTIVE | Noted: 2024-04-04

## 2024-04-04 LAB
ERYTHROCYTE [DISTWIDTH] IN BLOOD BY AUTOMATED COUNT: 13.8 % (ref 11.8–14.4)
HCT VFR BLD AUTO: 35.5 % (ref 40.7–50.3)
HGB BLD-MCNC: 11.8 G/DL (ref 13–17)
MCH RBC QN AUTO: 32.7 PG (ref 25.2–33.5)
MCHC RBC AUTO-ENTMCNC: 33.2 G/DL (ref 28.4–34.8)
MCV RBC AUTO: 98.3 FL (ref 82.6–102.9)
NRBC BLD-RTO: 0 PER 100 WBC
PLATELET # BLD AUTO: 215 K/UL (ref 138–453)
PMV BLD AUTO: 10.1 FL (ref 8.1–13.5)
RBC # BLD AUTO: 3.61 M/UL (ref 4.21–5.77)
WBC OTHER # BLD: 8 K/UL (ref 3.5–11.3)

## 2024-04-04 PROCEDURE — 6370000000 HC RX 637 (ALT 250 FOR IP): Performed by: INTERNAL MEDICINE

## 2024-04-04 PROCEDURE — 6370000000 HC RX 637 (ALT 250 FOR IP): Performed by: PSYCHIATRY & NEUROLOGY

## 2024-04-04 PROCEDURE — 85027 COMPLETE CBC AUTOMATED: CPT

## 2024-04-04 PROCEDURE — 36415 COLL VENOUS BLD VENIPUNCTURE: CPT

## 2024-04-04 PROCEDURE — 2580000003 HC RX 258: Performed by: NURSE PRACTITIONER

## 2024-04-04 PROCEDURE — 1200000000 HC SEMI PRIVATE

## 2024-04-04 PROCEDURE — 6370000000 HC RX 637 (ALT 250 FOR IP): Performed by: NURSE PRACTITIONER

## 2024-04-04 PROCEDURE — 6360000002 HC RX W HCPCS: Performed by: NURSE PRACTITIONER

## 2024-04-04 PROCEDURE — 99232 SBSQ HOSP IP/OBS MODERATE 35: CPT | Performed by: NURSE PRACTITIONER

## 2024-04-04 PROCEDURE — 99232 SBSQ HOSP IP/OBS MODERATE 35: CPT | Performed by: STUDENT IN AN ORGANIZED HEALTH CARE EDUCATION/TRAINING PROGRAM

## 2024-04-04 PROCEDURE — 6360000002 HC RX W HCPCS: Performed by: INTERNAL MEDICINE

## 2024-04-04 RX ORDER — CARVEDILOL 6.25 MG/1
6.25 TABLET ORAL 2 TIMES DAILY
Status: DISCONTINUED | OUTPATIENT
Start: 2024-04-04 | End: 2024-04-07

## 2024-04-04 RX ORDER — HYDRALAZINE HYDROCHLORIDE 20 MG/ML
5 INJECTION INTRAMUSCULAR; INTRAVENOUS EVERY 4 HOURS PRN
Status: DISCONTINUED | OUTPATIENT
Start: 2024-04-04 | End: 2024-04-11 | Stop reason: HOSPADM

## 2024-04-04 RX ORDER — LABETALOL HYDROCHLORIDE 5 MG/ML
20 INJECTION, SOLUTION INTRAVENOUS ONCE
Status: DISCONTINUED | OUTPATIENT
Start: 2024-04-04 | End: 2024-04-04

## 2024-04-04 RX ORDER — LISINOPRIL 20 MG/1
20 TABLET ORAL DAILY
Status: DISCONTINUED | OUTPATIENT
Start: 2024-04-04 | End: 2024-04-07

## 2024-04-04 RX ORDER — LABETALOL HYDROCHLORIDE 5 MG/ML
10 INJECTION, SOLUTION INTRAVENOUS EVERY 4 HOURS PRN
Status: DISCONTINUED | OUTPATIENT
Start: 2024-04-04 | End: 2024-04-11 | Stop reason: HOSPADM

## 2024-04-04 RX ADMIN — SODIUM CHLORIDE, PRESERVATIVE FREE 10 ML: 5 INJECTION INTRAVENOUS at 21:24

## 2024-04-04 RX ADMIN — AMLODIPINE BESYLATE 10 MG: 10 TABLET ORAL at 10:01

## 2024-04-04 RX ADMIN — QUETIAPINE FUMARATE 25 MG: 25 TABLET ORAL at 21:19

## 2024-04-04 RX ADMIN — ENOXAPARIN SODIUM 40 MG: 100 INJECTION SUBCUTANEOUS at 10:00

## 2024-04-04 RX ADMIN — HYDRALAZINE HYDROCHLORIDE 5 MG: 20 INJECTION INTRAMUSCULAR; INTRAVENOUS at 06:52

## 2024-04-04 RX ADMIN — SODIUM CHLORIDE, PRESERVATIVE FREE 10 ML: 5 INJECTION INTRAVENOUS at 10:01

## 2024-04-04 RX ADMIN — TAMSULOSIN HYDROCHLORIDE 0.4 MG: 0.4 CAPSULE ORAL at 10:01

## 2024-04-04 RX ADMIN — MEMANTINE 5 MG: 5 TABLET ORAL at 21:19

## 2024-04-04 RX ADMIN — Medication 400 MG: at 10:01

## 2024-04-04 RX ADMIN — CARVEDILOL 6.25 MG: 6.25 TABLET, FILM COATED ORAL at 12:05

## 2024-04-04 RX ADMIN — Medication 30 G: at 10:01

## 2024-04-04 RX ADMIN — LISINOPRIL 20 MG: 20 TABLET ORAL at 10:02

## 2024-04-04 RX ADMIN — PANTOPRAZOLE SODIUM 40 MG: 40 TABLET, DELAYED RELEASE ORAL at 10:02

## 2024-04-04 RX ADMIN — CARVEDILOL 6.25 MG: 6.25 TABLET, FILM COATED ORAL at 21:19

## 2024-04-04 RX ADMIN — MEMANTINE 5 MG: 5 TABLET ORAL at 10:02

## 2024-04-04 ASSESSMENT — PAIN SCALES - GENERAL: PAINLEVEL_OUTOF10: 0

## 2024-04-04 NOTE — SIGNIFICANT EVENT
Patient yelling and becoming more aggressive as the night goes on. Stimulating others to act aggressively. He's been redirected, non medical strategies have been used by multiple individuals. He is cursing and looking to run out. Paranoid delusions. Concern for harm to self and others. Sitter, prn meds, continue to redirct.
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
## 2024-04-05 PROCEDURE — 6370000000 HC RX 637 (ALT 250 FOR IP): Performed by: PSYCHIATRY & NEUROLOGY

## 2024-04-05 PROCEDURE — 6370000000 HC RX 637 (ALT 250 FOR IP): Performed by: INTERNAL MEDICINE

## 2024-04-05 PROCEDURE — 94761 N-INVAS EAR/PLS OXIMETRY MLT: CPT

## 2024-04-05 PROCEDURE — 6370000000 HC RX 637 (ALT 250 FOR IP): Performed by: NURSE PRACTITIONER

## 2024-04-05 PROCEDURE — 99232 SBSQ HOSP IP/OBS MODERATE 35: CPT | Performed by: STUDENT IN AN ORGANIZED HEALTH CARE EDUCATION/TRAINING PROGRAM

## 2024-04-05 PROCEDURE — 1200000000 HC SEMI PRIVATE

## 2024-04-05 PROCEDURE — 6360000002 HC RX W HCPCS: Performed by: NURSE PRACTITIONER

## 2024-04-05 PROCEDURE — 2700000000 HC OXYGEN THERAPY PER DAY

## 2024-04-05 PROCEDURE — 2580000003 HC RX 258: Performed by: NURSE PRACTITIONER

## 2024-04-05 RX ORDER — LANOLIN ALCOHOL/MO/W.PET/CERES
6 CREAM (GRAM) TOPICAL ONCE
Status: DISCONTINUED | OUTPATIENT
Start: 2024-04-05 | End: 2024-04-11 | Stop reason: HOSPADM

## 2024-04-05 RX ADMIN — LISINOPRIL 20 MG: 20 TABLET ORAL at 10:38

## 2024-04-05 RX ADMIN — QUETIAPINE FUMARATE 25 MG: 25 TABLET ORAL at 21:50

## 2024-04-05 RX ADMIN — ACETAMINOPHEN 650 MG: 325 TABLET ORAL at 01:52

## 2024-04-05 RX ADMIN — SODIUM CHLORIDE, PRESERVATIVE FREE 10 ML: 5 INJECTION INTRAVENOUS at 21:51

## 2024-04-05 RX ADMIN — PANTOPRAZOLE SODIUM 40 MG: 40 TABLET, DELAYED RELEASE ORAL at 10:38

## 2024-04-05 RX ADMIN — SODIUM CHLORIDE, PRESERVATIVE FREE 10 ML: 5 INJECTION INTRAVENOUS at 10:39

## 2024-04-05 RX ADMIN — MEMANTINE 5 MG: 5 TABLET ORAL at 10:38

## 2024-04-05 RX ADMIN — RISPERIDONE 0.5 MG: 0.25 TABLET, FILM COATED ORAL at 02:22

## 2024-04-05 RX ADMIN — TAMSULOSIN HYDROCHLORIDE 0.4 MG: 0.4 CAPSULE ORAL at 10:38

## 2024-04-05 RX ADMIN — CARVEDILOL 6.25 MG: 6.25 TABLET, FILM COATED ORAL at 10:38

## 2024-04-05 RX ADMIN — ENOXAPARIN SODIUM 40 MG: 100 INJECTION SUBCUTANEOUS at 10:38

## 2024-04-05 RX ADMIN — MEMANTINE 5 MG: 5 TABLET ORAL at 21:50

## 2024-04-05 RX ADMIN — AMLODIPINE BESYLATE 10 MG: 10 TABLET ORAL at 10:38

## 2024-04-05 RX ADMIN — Medication 30 G: at 10:39

## 2024-04-05 RX ADMIN — Medication 400 MG: at 10:38

## 2024-04-05 RX ADMIN — CARVEDILOL 6.25 MG: 6.25 TABLET, FILM COATED ORAL at 21:51

## 2024-04-05 RX ADMIN — ACETAMINOPHEN 650 MG: 325 TABLET ORAL at 14:04

## 2024-04-05 ASSESSMENT — PAIN DESCRIPTION - ORIENTATION: ORIENTATION: RIGHT;LEFT

## 2024-04-05 ASSESSMENT — PAIN SCALES - GENERAL
PAINLEVEL_OUTOF10: 0
PAINLEVEL_OUTOF10: 3
PAINLEVEL_OUTOF10: 0
PAINLEVEL_OUTOF10: 0
PAINLEVEL_OUTOF10: 3

## 2024-04-05 ASSESSMENT — PAIN DESCRIPTION - DESCRIPTORS
DESCRIPTORS: ACHING
DESCRIPTORS: THROBBING

## 2024-04-05 ASSESSMENT — PAIN DESCRIPTION - LOCATION: LOCATION: HEAD

## 2024-04-05 ASSESSMENT — PAIN - FUNCTIONAL ASSESSMENT: PAIN_FUNCTIONAL_ASSESSMENT: ACTIVITIES ARE NOT PREVENTED

## 2024-04-05 NOTE — DISCHARGE INSTR - COC
Continuity of Care Form    Patient Name: Alonzo Crespo   :  1960  MRN:  [id number]    Admit date:  3/22/2024  Discharge date:  2024    Code Status Order: Full Code   Advance Directives:     Admitting Physician:  Eugene Fiore MD  PCP: Ana Méndez    Discharging Nurse: [facility] Unit/Room#:   Discharging Unit Phone Number: [phone]    Emergency Contact:   Extended Emergency Contact Information  Primary Emergency Contact: basil calloway  Home Phone: [phone]  Relation: Friend    Past Surgical History:  Past Surgical History:   Procedure Laterality Date    ARM SURGERY Right        Immunization History:     There is no immunization history on file for this patient.    Active Problems:  Patient Active Problem List   Diagnosis Code    Hyponatremia E87.1    Acute metabolic encephalopathy G93.41    Aspiration pneumonia of both lower lobes (HCC) J69.0    COPD, severe (HCC) J44.9    Primary hypertension I10    Chronic respiratory failure with hypoxia and hypercapnia (HCC) J96.11, J96.12    Tobacco abuse Z72.0    Altered mental status R41.82    Urinary retention R33.9    Major neurocognitive disorder due to another medical condition, with agitation (HCC) F02.811       Isolation/Infection:   Isolation            No Isolation          Patient Infection Status       None to display                     Nurse Assessment:  Last Vital Signs: BP (!) 146/77   Pulse 76   Temp 97.7 °F (36.5 °C) (Oral)   Resp 18   Ht 1.626 m (5' 4\")   Wt 58.6 kg (129 lb 1.6 oz)   SpO2 94%   BMI 22.16 kg/m²     Last documented pain score (0-10 scale): Pain Level: 0  Last Weight:   Wt Readings from Last 1 Encounters:   24 58.6 kg (129 lb 1.6 oz)     Mental Status:  oriented and alert    IV Access:  - None    Nursing Mobility/ADLs:  Walking   Independent  Transfer  Independent  Bathing  Assisted  Dressing  Independent  Toileting  Independent  Feeding  Independent  Med Admin

## 2024-04-06 LAB
ANION GAP SERPL CALCULATED.3IONS-SCNC: 5 MMOL/L (ref 9–17)
BUN SERPL-MCNC: 15 MG/DL (ref 8–23)
BUN/CREAT SERPL: ABNORMAL (ref 9–20)
CALCIUM SERPL-MCNC: 8.9 MG/DL (ref 8.6–10.4)
CHLORIDE SERPL-SCNC: 88 MMOL/L (ref 98–107)
CO2 SERPL-SCNC: 37 MMOL/L (ref 20–31)
CREAT SERPL-MCNC: <0.4 MG/DL (ref 0.7–1.2)
CRITICAL ACTION: NORMAL
CRITICAL NOTIFICATION DATE/TIME: NORMAL
CRITICAL NOTIFICATION: NORMAL
CRITICAL VALUE READ BACK: YES
GFR SERPL CREATININE-BSD FRML MDRD: ABNORMAL ML/MIN/1.73M2
GLUCOSE BLD-MCNC: 147 MG/DL (ref 75–110)
GLUCOSE SERPL-MCNC: 101 MG/DL (ref 70–99)
HCO3 VENOUS: 43.6 MMOL/L (ref 22–29)
O2 SAT, VEN: 98.9 % (ref 60–85)
PCO2, VEN: 62.2 MM HG (ref 41–51)
PH VENOUS: 7.45 (ref 7.32–7.43)
PO2, VEN: 128.5 MM HG (ref 30–50)
POSITIVE BASE EXCESS, VEN: 16.4 MMOL/L (ref 0–3)
POTASSIUM SERPL-SCNC: 4.2 MMOL/L (ref 3.7–5.3)
SODIUM SERPL-SCNC: 130 MMOL/L (ref 135–144)

## 2024-04-06 PROCEDURE — 82803 BLOOD GASES ANY COMBINATION: CPT

## 2024-04-06 PROCEDURE — 6370000000 HC RX 637 (ALT 250 FOR IP): Performed by: INTERNAL MEDICINE

## 2024-04-06 PROCEDURE — 6370000000 HC RX 637 (ALT 250 FOR IP): Performed by: PSYCHIATRY & NEUROLOGY

## 2024-04-06 PROCEDURE — 1200000000 HC SEMI PRIVATE

## 2024-04-06 PROCEDURE — 6360000002 HC RX W HCPCS: Performed by: NURSE PRACTITIONER

## 2024-04-06 PROCEDURE — 82947 ASSAY GLUCOSE BLOOD QUANT: CPT

## 2024-04-06 PROCEDURE — 6370000000 HC RX 637 (ALT 250 FOR IP): Performed by: NURSE PRACTITIONER

## 2024-04-06 PROCEDURE — 36415 COLL VENOUS BLD VENIPUNCTURE: CPT

## 2024-04-06 PROCEDURE — 94761 N-INVAS EAR/PLS OXIMETRY MLT: CPT

## 2024-04-06 PROCEDURE — 6370000000 HC RX 637 (ALT 250 FOR IP): Performed by: STUDENT IN AN ORGANIZED HEALTH CARE EDUCATION/TRAINING PROGRAM

## 2024-04-06 PROCEDURE — 80048 BASIC METABOLIC PNL TOTAL CA: CPT

## 2024-04-06 PROCEDURE — 2700000000 HC OXYGEN THERAPY PER DAY

## 2024-04-06 PROCEDURE — 99232 SBSQ HOSP IP/OBS MODERATE 35: CPT | Performed by: STUDENT IN AN ORGANIZED HEALTH CARE EDUCATION/TRAINING PROGRAM

## 2024-04-06 RX ORDER — QUETIAPINE FUMARATE 25 MG/1
50 TABLET, FILM COATED ORAL NIGHTLY
Status: DISCONTINUED | OUTPATIENT
Start: 2024-04-06 | End: 2024-04-08

## 2024-04-06 RX ADMIN — MEMANTINE 5 MG: 5 TABLET ORAL at 08:15

## 2024-04-06 RX ADMIN — QUETIAPINE FUMARATE 50 MG: 25 TABLET ORAL at 20:43

## 2024-04-06 RX ADMIN — LISINOPRIL 20 MG: 20 TABLET ORAL at 08:15

## 2024-04-06 RX ADMIN — RISPERIDONE 0.5 MG: 0.25 TABLET, FILM COATED ORAL at 02:41

## 2024-04-06 RX ADMIN — CARVEDILOL 6.25 MG: 6.25 TABLET, FILM COATED ORAL at 08:15

## 2024-04-06 RX ADMIN — MEMANTINE 5 MG: 5 TABLET ORAL at 20:43

## 2024-04-06 RX ADMIN — ONDANSETRON 4 MG: 4 TABLET, ORALLY DISINTEGRATING ORAL at 02:45

## 2024-04-06 RX ADMIN — ENOXAPARIN SODIUM 40 MG: 100 INJECTION SUBCUTANEOUS at 08:15

## 2024-04-06 RX ADMIN — TAMSULOSIN HYDROCHLORIDE 0.4 MG: 0.4 CAPSULE ORAL at 08:15

## 2024-04-06 RX ADMIN — Medication 30 G: at 08:15

## 2024-04-06 RX ADMIN — RISPERIDONE 0.5 MG: 0.25 TABLET, FILM COATED ORAL at 20:43

## 2024-04-06 RX ADMIN — Medication 400 MG: at 08:15

## 2024-04-06 RX ADMIN — ACETAMINOPHEN 650 MG: 325 TABLET ORAL at 22:54

## 2024-04-06 RX ADMIN — AMLODIPINE BESYLATE 10 MG: 10 TABLET ORAL at 08:15

## 2024-04-06 RX ADMIN — CARVEDILOL 6.25 MG: 6.25 TABLET, FILM COATED ORAL at 20:43

## 2024-04-06 RX ADMIN — ACETAMINOPHEN 650 MG: 325 TABLET ORAL at 11:01

## 2024-04-06 RX ADMIN — PANTOPRAZOLE SODIUM 40 MG: 40 TABLET, DELAYED RELEASE ORAL at 06:26

## 2024-04-06 RX ADMIN — ACETAMINOPHEN 650 MG: 325 TABLET ORAL at 01:34

## 2024-04-06 ASSESSMENT — PAIN DESCRIPTION - LOCATION
LOCATION: GENERALIZED
LOCATION: HEAD
LOCATION: HEAD

## 2024-04-06 ASSESSMENT — PAIN SCALES - GENERAL
PAINLEVEL_OUTOF10: 10
PAINLEVEL_OUTOF10: 3
PAINLEVEL_OUTOF10: 3

## 2024-04-06 ASSESSMENT — PAIN - FUNCTIONAL ASSESSMENT: PAIN_FUNCTIONAL_ASSESSMENT: ACTIVITIES ARE NOT PREVENTED

## 2024-04-06 ASSESSMENT — PAIN DESCRIPTION - DESCRIPTORS: DESCRIPTORS: ACHING

## 2024-04-07 LAB
ANION GAP SERPL CALCULATED.3IONS-SCNC: 3 MMOL/L (ref 9–17)
BUN SERPL-MCNC: 22 MG/DL (ref 8–23)
BUN/CREAT SERPL: 37 (ref 9–20)
CALCIUM SERPL-MCNC: 8.7 MG/DL (ref 8.6–10.4)
CHLORIDE SERPL-SCNC: 90 MMOL/L (ref 98–107)
CO2 SERPL-SCNC: 38 MMOL/L (ref 20–31)
CREAT SERPL-MCNC: 0.6 MG/DL (ref 0.7–1.2)
GFR SERPL CREATININE-BSD FRML MDRD: >90 ML/MIN/1.73M2
GLUCOSE SERPL-MCNC: 171 MG/DL (ref 70–99)
POTASSIUM SERPL-SCNC: 4.5 MMOL/L (ref 3.7–5.3)
SODIUM SERPL-SCNC: 131 MMOL/L (ref 135–144)

## 2024-04-07 PROCEDURE — 99231 SBSQ HOSP IP/OBS SF/LOW 25: CPT | Performed by: INTERNAL MEDICINE

## 2024-04-07 PROCEDURE — 6370000000 HC RX 637 (ALT 250 FOR IP): Performed by: PSYCHIATRY & NEUROLOGY

## 2024-04-07 PROCEDURE — 6370000000 HC RX 637 (ALT 250 FOR IP): Performed by: NURSE PRACTITIONER

## 2024-04-07 PROCEDURE — 6370000000 HC RX 637 (ALT 250 FOR IP): Performed by: INTERNAL MEDICINE

## 2024-04-07 PROCEDURE — 94761 N-INVAS EAR/PLS OXIMETRY MLT: CPT

## 2024-04-07 PROCEDURE — 80048 BASIC METABOLIC PNL TOTAL CA: CPT

## 2024-04-07 PROCEDURE — 36415 COLL VENOUS BLD VENIPUNCTURE: CPT

## 2024-04-07 PROCEDURE — 1200000000 HC SEMI PRIVATE

## 2024-04-07 PROCEDURE — 6360000002 HC RX W HCPCS: Performed by: NURSE PRACTITIONER

## 2024-04-07 PROCEDURE — 6370000000 HC RX 637 (ALT 250 FOR IP): Performed by: STUDENT IN AN ORGANIZED HEALTH CARE EDUCATION/TRAINING PROGRAM

## 2024-04-07 RX ORDER — CARVEDILOL 12.5 MG/1
12.5 TABLET ORAL 2 TIMES DAILY
Status: DISCONTINUED | OUTPATIENT
Start: 2024-04-07 | End: 2024-04-11 | Stop reason: HOSPADM

## 2024-04-07 RX ORDER — LISINOPRIL 40 MG/1
40 TABLET ORAL DAILY
Status: DISCONTINUED | OUTPATIENT
Start: 2024-04-08 | End: 2024-04-11 | Stop reason: HOSPADM

## 2024-04-07 RX ADMIN — CARVEDILOL 6.25 MG: 6.25 TABLET, FILM COATED ORAL at 08:13

## 2024-04-07 RX ADMIN — Medication 400 MG: at 08:12

## 2024-04-07 RX ADMIN — ACETAMINOPHEN 650 MG: 325 TABLET ORAL at 06:31

## 2024-04-07 RX ADMIN — TAMSULOSIN HYDROCHLORIDE 0.4 MG: 0.4 CAPSULE ORAL at 08:12

## 2024-04-07 RX ADMIN — MEMANTINE 5 MG: 5 TABLET ORAL at 08:13

## 2024-04-07 RX ADMIN — AMLODIPINE BESYLATE 10 MG: 10 TABLET ORAL at 08:13

## 2024-04-07 RX ADMIN — Medication 30 G: at 08:13

## 2024-04-07 RX ADMIN — RISPERIDONE 0.5 MG: 0.25 TABLET, FILM COATED ORAL at 20:26

## 2024-04-07 RX ADMIN — ACETAMINOPHEN 650 MG: 325 TABLET ORAL at 20:26

## 2024-04-07 RX ADMIN — MEMANTINE 5 MG: 5 TABLET ORAL at 20:27

## 2024-04-07 RX ADMIN — ENOXAPARIN SODIUM 40 MG: 100 INJECTION SUBCUTANEOUS at 08:13

## 2024-04-07 RX ADMIN — LISINOPRIL 20 MG: 20 TABLET ORAL at 08:13

## 2024-04-07 RX ADMIN — CARVEDILOL 12.5 MG: 12.5 TABLET, FILM COATED ORAL at 20:26

## 2024-04-07 RX ADMIN — QUETIAPINE FUMARATE 50 MG: 25 TABLET ORAL at 20:26

## 2024-04-07 RX ADMIN — PANTOPRAZOLE SODIUM 40 MG: 40 TABLET, DELAYED RELEASE ORAL at 05:57

## 2024-04-07 ASSESSMENT — PAIN - FUNCTIONAL ASSESSMENT
PAIN_FUNCTIONAL_ASSESSMENT: ACTIVITIES ARE NOT PREVENTED
PAIN_FUNCTIONAL_ASSESSMENT: ACTIVITIES ARE NOT PREVENTED

## 2024-04-07 ASSESSMENT — PAIN DESCRIPTION - LOCATION
LOCATION: GENERALIZED
LOCATION: GENERALIZED

## 2024-04-07 ASSESSMENT — PAIN DESCRIPTION - DESCRIPTORS
DESCRIPTORS: ACHING
DESCRIPTORS: ACHING

## 2024-04-07 ASSESSMENT — PAIN SCALES - GENERAL
PAINLEVEL_OUTOF10: 3
PAINLEVEL_OUTOF10: 3

## 2024-04-08 PROBLEM — R33.9 URINARY RETENTION: Status: RESOLVED | Noted: 2024-03-25 | Resolved: 2024-04-08

## 2024-04-08 PROBLEM — J69.0 ASPIRATION PNEUMONIA OF BOTH LOWER LOBES (HCC): Status: RESOLVED | Noted: 2024-03-22 | Resolved: 2024-04-08

## 2024-04-08 PROBLEM — G93.41 ACUTE METABOLIC ENCEPHALOPATHY: Status: RESOLVED | Noted: 2024-03-22 | Resolved: 2024-04-08

## 2024-04-08 LAB
ANION GAP SERPL CALCULATED.3IONS-SCNC: 3 MMOL/L (ref 9–17)
BUN SERPL-MCNC: 16 MG/DL (ref 8–23)
BUN/CREAT SERPL: 32 (ref 9–20)
CALCIUM SERPL-MCNC: 8.9 MG/DL (ref 8.6–10.4)
CHLORIDE SERPL-SCNC: 93 MMOL/L (ref 98–107)
CO2 SERPL-SCNC: 38 MMOL/L (ref 20–31)
CREAT SERPL-MCNC: 0.5 MG/DL (ref 0.7–1.2)
GFR SERPL CREATININE-BSD FRML MDRD: >90 ML/MIN/1.73M2
GLUCOSE SERPL-MCNC: 101 MG/DL (ref 70–99)
POTASSIUM SERPL-SCNC: 4.9 MMOL/L (ref 3.7–5.3)
SODIUM SERPL-SCNC: 134 MMOL/L (ref 135–144)

## 2024-04-08 PROCEDURE — 6370000000 HC RX 637 (ALT 250 FOR IP): Performed by: INTERNAL MEDICINE

## 2024-04-08 PROCEDURE — 6370000000 HC RX 637 (ALT 250 FOR IP): Performed by: PSYCHIATRY & NEUROLOGY

## 2024-04-08 PROCEDURE — 6360000002 HC RX W HCPCS: Performed by: NURSE PRACTITIONER

## 2024-04-08 PROCEDURE — 6370000000 HC RX 637 (ALT 250 FOR IP): Performed by: NURSE PRACTITIONER

## 2024-04-08 PROCEDURE — 36415 COLL VENOUS BLD VENIPUNCTURE: CPT

## 2024-04-08 PROCEDURE — 1200000000 HC SEMI PRIVATE

## 2024-04-08 PROCEDURE — 99232 SBSQ HOSP IP/OBS MODERATE 35: CPT | Performed by: INTERNAL MEDICINE

## 2024-04-08 PROCEDURE — 80048 BASIC METABOLIC PNL TOTAL CA: CPT

## 2024-04-08 PROCEDURE — 94761 N-INVAS EAR/PLS OXIMETRY MLT: CPT

## 2024-04-08 RX ORDER — NIFEDIPINE 30 MG/1
60 TABLET, EXTENDED RELEASE ORAL DAILY
Status: DISCONTINUED | OUTPATIENT
Start: 2024-04-09 | End: 2024-04-08

## 2024-04-08 RX ORDER — KETOROLAC TROMETHAMINE 15 MG/ML
15 INJECTION, SOLUTION INTRAMUSCULAR; INTRAVENOUS ONCE
Status: DISCONTINUED | OUTPATIENT
Start: 2024-04-08 | End: 2024-04-08

## 2024-04-08 RX ORDER — AMLODIPINE BESYLATE 10 MG/1
10 TABLET ORAL DAILY
Status: DISCONTINUED | OUTPATIENT
Start: 2024-04-09 | End: 2024-04-11 | Stop reason: HOSPADM

## 2024-04-08 RX ORDER — QUETIAPINE FUMARATE 100 MG/1
100 TABLET, FILM COATED ORAL NIGHTLY
Status: DISCONTINUED | OUTPATIENT
Start: 2024-04-08 | End: 2024-04-11 | Stop reason: HOSPADM

## 2024-04-08 RX ORDER — KETOROLAC TROMETHAMINE 15 MG/ML
15 INJECTION, SOLUTION INTRAMUSCULAR; INTRAVENOUS ONCE
Status: COMPLETED | OUTPATIENT
Start: 2024-04-08 | End: 2024-04-08

## 2024-04-08 RX ADMIN — KETOROLAC TROMETHAMINE 15 MG: 15 INJECTION, SOLUTION INTRAMUSCULAR; INTRAVENOUS at 22:18

## 2024-04-08 RX ADMIN — RISPERIDONE 0.5 MG: 0.25 TABLET, FILM COATED ORAL at 20:09

## 2024-04-08 RX ADMIN — ACETAMINOPHEN 650 MG: 325 TABLET ORAL at 03:17

## 2024-04-08 RX ADMIN — ENOXAPARIN SODIUM 40 MG: 100 INJECTION SUBCUTANEOUS at 08:04

## 2024-04-08 RX ADMIN — CARVEDILOL 12.5 MG: 12.5 TABLET, FILM COATED ORAL at 08:01

## 2024-04-08 RX ADMIN — TAMSULOSIN HYDROCHLORIDE 0.4 MG: 0.4 CAPSULE ORAL at 08:01

## 2024-04-08 RX ADMIN — ACETAMINOPHEN 650 MG: 325 TABLET ORAL at 19:33

## 2024-04-08 RX ADMIN — PANTOPRAZOLE SODIUM 40 MG: 40 TABLET, DELAYED RELEASE ORAL at 08:01

## 2024-04-08 RX ADMIN — AMLODIPINE BESYLATE 10 MG: 10 TABLET ORAL at 08:01

## 2024-04-08 RX ADMIN — Medication 30 G: at 08:02

## 2024-04-08 RX ADMIN — LISINOPRIL 40 MG: 40 TABLET ORAL at 08:01

## 2024-04-08 RX ADMIN — QUETIAPINE FUMARATE 100 MG: 100 TABLET ORAL at 20:09

## 2024-04-08 RX ADMIN — MEMANTINE 5 MG: 5 TABLET ORAL at 08:01

## 2024-04-08 RX ADMIN — CARVEDILOL 12.5 MG: 12.5 TABLET, FILM COATED ORAL at 20:09

## 2024-04-08 RX ADMIN — Medication 400 MG: at 08:01

## 2024-04-08 RX ADMIN — MEMANTINE 5 MG: 5 TABLET ORAL at 20:09

## 2024-04-08 ASSESSMENT — PAIN DESCRIPTION - LOCATION
LOCATION: HAND
LOCATION: GENERALIZED
LOCATION: LEG
LOCATION: GENERALIZED

## 2024-04-08 ASSESSMENT — PAIN SCALES - GENERAL
PAINLEVEL_OUTOF10: 5
PAINLEVEL_OUTOF10: 3
PAINLEVEL_OUTOF10: 7
PAINLEVEL_OUTOF10: 3
PAINLEVEL_OUTOF10: 10
PAINLEVEL_OUTOF10: 3
PAINLEVEL_OUTOF10: 0

## 2024-04-08 ASSESSMENT — PAIN DESCRIPTION - DESCRIPTORS
DESCRIPTORS: ACHING

## 2024-04-08 ASSESSMENT — PAIN - FUNCTIONAL ASSESSMENT
PAIN_FUNCTIONAL_ASSESSMENT: ACTIVITIES ARE NOT PREVENTED

## 2024-04-08 ASSESSMENT — PAIN DESCRIPTION - PAIN TYPE: TYPE: ACUTE PAIN

## 2024-04-08 ASSESSMENT — PAIN DESCRIPTION - ORIENTATION: ORIENTATION: RIGHT;LEFT

## 2024-04-08 ASSESSMENT — PAIN SCALES - WONG BAKER: WONGBAKER_NUMERICALRESPONSE: NO HURT

## 2024-04-09 LAB
ANION GAP SERPL CALCULATED.3IONS-SCNC: 5 MMOL/L (ref 9–17)
BUN SERPL-MCNC: 16 MG/DL (ref 8–23)
BUN/CREAT SERPL: 27 (ref 9–20)
CALCIUM SERPL-MCNC: 8.7 MG/DL (ref 8.6–10.4)
CHLORIDE SERPL-SCNC: 94 MMOL/L (ref 98–107)
CO2 SERPL-SCNC: 35 MMOL/L (ref 20–31)
CREAT SERPL-MCNC: 0.6 MG/DL (ref 0.7–1.2)
GFR SERPL CREATININE-BSD FRML MDRD: >90 ML/MIN/1.73M2
GLUCOSE SERPL-MCNC: 102 MG/DL (ref 70–99)
POTASSIUM SERPL-SCNC: 4.5 MMOL/L (ref 3.7–5.3)
SODIUM SERPL-SCNC: 134 MMOL/L (ref 135–144)

## 2024-04-09 PROCEDURE — 6370000000 HC RX 637 (ALT 250 FOR IP): Performed by: NURSE PRACTITIONER

## 2024-04-09 PROCEDURE — 6360000002 HC RX W HCPCS: Performed by: NURSE PRACTITIONER

## 2024-04-09 PROCEDURE — 36415 COLL VENOUS BLD VENIPUNCTURE: CPT

## 2024-04-09 PROCEDURE — 94761 N-INVAS EAR/PLS OXIMETRY MLT: CPT

## 2024-04-09 PROCEDURE — 2700000000 HC OXYGEN THERAPY PER DAY

## 2024-04-09 PROCEDURE — 6370000000 HC RX 637 (ALT 250 FOR IP): Performed by: INTERNAL MEDICINE

## 2024-04-09 PROCEDURE — 80048 BASIC METABOLIC PNL TOTAL CA: CPT

## 2024-04-09 PROCEDURE — 6370000000 HC RX 637 (ALT 250 FOR IP): Performed by: PSYCHIATRY & NEUROLOGY

## 2024-04-09 PROCEDURE — 1200000000 HC SEMI PRIVATE

## 2024-04-09 PROCEDURE — 99231 SBSQ HOSP IP/OBS SF/LOW 25: CPT | Performed by: INTERNAL MEDICINE

## 2024-04-09 RX ORDER — TORSEMIDE 10 MG/1
5 TABLET ORAL DAILY
Status: DISCONTINUED | OUTPATIENT
Start: 2024-04-09 | End: 2024-04-11

## 2024-04-09 RX ORDER — KETOROLAC TROMETHAMINE 15 MG/ML
15 INJECTION, SOLUTION INTRAMUSCULAR; INTRAVENOUS ONCE
Status: DISCONTINUED | OUTPATIENT
Start: 2024-04-09 | End: 2024-04-09

## 2024-04-09 RX ORDER — KETOROLAC TROMETHAMINE 15 MG/ML
15 INJECTION, SOLUTION INTRAMUSCULAR; INTRAVENOUS ONCE
Status: COMPLETED | OUTPATIENT
Start: 2024-04-09 | End: 2024-04-09

## 2024-04-09 RX ADMIN — TAMSULOSIN HYDROCHLORIDE 0.4 MG: 0.4 CAPSULE ORAL at 09:30

## 2024-04-09 RX ADMIN — QUETIAPINE FUMARATE 100 MG: 100 TABLET ORAL at 21:08

## 2024-04-09 RX ADMIN — MEMANTINE 5 MG: 5 TABLET ORAL at 09:30

## 2024-04-09 RX ADMIN — CARVEDILOL 12.5 MG: 12.5 TABLET, FILM COATED ORAL at 09:30

## 2024-04-09 RX ADMIN — Medication 400 MG: at 09:30

## 2024-04-09 RX ADMIN — ENOXAPARIN SODIUM 40 MG: 100 INJECTION SUBCUTANEOUS at 09:30

## 2024-04-09 RX ADMIN — CARVEDILOL 12.5 MG: 12.5 TABLET, FILM COATED ORAL at 21:08

## 2024-04-09 RX ADMIN — LISINOPRIL 40 MG: 40 TABLET ORAL at 09:30

## 2024-04-09 RX ADMIN — RISPERIDONE 0.5 MG: 0.25 TABLET, FILM COATED ORAL at 15:52

## 2024-04-09 RX ADMIN — MEMANTINE 5 MG: 5 TABLET ORAL at 21:08

## 2024-04-09 RX ADMIN — KETOROLAC TROMETHAMINE 15 MG: 15 INJECTION, SOLUTION INTRAMUSCULAR; INTRAVENOUS at 23:08

## 2024-04-09 RX ADMIN — TORSEMIDE 5 MG: 10 TABLET ORAL at 09:47

## 2024-04-09 RX ADMIN — PANTOPRAZOLE SODIUM 40 MG: 40 TABLET, DELAYED RELEASE ORAL at 06:01

## 2024-04-09 RX ADMIN — AMLODIPINE BESYLATE 10 MG: 10 TABLET ORAL at 09:30

## 2024-04-09 ASSESSMENT — PAIN DESCRIPTION - ORIENTATION: ORIENTATION: RIGHT;LEFT

## 2024-04-09 ASSESSMENT — PAIN SCALES - GENERAL
PAINLEVEL_OUTOF10: 0
PAINLEVEL_OUTOF10: 5
PAINLEVEL_OUTOF10: 10
PAINLEVEL_OUTOF10: 3

## 2024-04-09 ASSESSMENT — PAIN DESCRIPTION - FREQUENCY: FREQUENCY: INTERMITTENT

## 2024-04-09 ASSESSMENT — PAIN DESCRIPTION - PAIN TYPE: TYPE: CHRONIC PAIN

## 2024-04-09 ASSESSMENT — PAIN - FUNCTIONAL ASSESSMENT: PAIN_FUNCTIONAL_ASSESSMENT: PREVENTS OR INTERFERES SOME ACTIVE ACTIVITIES AND ADLS

## 2024-04-09 ASSESSMENT — PAIN DESCRIPTION - LOCATION: LOCATION: LEG

## 2024-04-09 ASSESSMENT — PAIN DESCRIPTION - DESCRIPTORS: DESCRIPTORS: ACHING

## 2024-04-10 LAB
ANION GAP SERPL CALCULATED.3IONS-SCNC: 10 MMOL/L (ref 9–17)
BUN SERPL-MCNC: 18 MG/DL (ref 8–23)
BUN/CREAT SERPL: 30 (ref 9–20)
CALCIUM SERPL-MCNC: 9 MG/DL (ref 8.6–10.4)
CHLORIDE SERPL-SCNC: 91 MMOL/L (ref 98–107)
CO2 SERPL-SCNC: 28 MMOL/L (ref 20–31)
CREAT SERPL-MCNC: 0.6 MG/DL (ref 0.7–1.2)
GFR SERPL CREATININE-BSD FRML MDRD: >90 ML/MIN/1.73M2
GLUCOSE SERPL-MCNC: 121 MG/DL (ref 70–99)
POTASSIUM SERPL-SCNC: 5 MMOL/L (ref 3.7–5.3)
SODIUM SERPL-SCNC: 129 MMOL/L (ref 135–144)

## 2024-04-10 PROCEDURE — 6370000000 HC RX 637 (ALT 250 FOR IP): Performed by: NURSE PRACTITIONER

## 2024-04-10 PROCEDURE — 1200000000 HC SEMI PRIVATE

## 2024-04-10 PROCEDURE — 6360000002 HC RX W HCPCS: Performed by: NURSE PRACTITIONER

## 2024-04-10 PROCEDURE — 6370000000 HC RX 637 (ALT 250 FOR IP): Performed by: INTERNAL MEDICINE

## 2024-04-10 PROCEDURE — 6370000000 HC RX 637 (ALT 250 FOR IP): Performed by: PSYCHIATRY & NEUROLOGY

## 2024-04-10 PROCEDURE — 94761 N-INVAS EAR/PLS OXIMETRY MLT: CPT

## 2024-04-10 PROCEDURE — 80048 BASIC METABOLIC PNL TOTAL CA: CPT

## 2024-04-10 PROCEDURE — 2700000000 HC OXYGEN THERAPY PER DAY

## 2024-04-10 PROCEDURE — 99232 SBSQ HOSP IP/OBS MODERATE 35: CPT | Performed by: NURSE PRACTITIONER

## 2024-04-10 PROCEDURE — 36415 COLL VENOUS BLD VENIPUNCTURE: CPT

## 2024-04-10 RX ORDER — IBUPROFEN 400 MG/1
400 TABLET ORAL EVERY 6 HOURS PRN
Status: DISCONTINUED | OUTPATIENT
Start: 2024-04-10 | End: 2024-04-11 | Stop reason: HOSPADM

## 2024-04-10 RX ORDER — KETOROLAC TROMETHAMINE 15 MG/ML
15 INJECTION, SOLUTION INTRAMUSCULAR; INTRAVENOUS ONCE
Status: COMPLETED | OUTPATIENT
Start: 2024-04-10 | End: 2024-04-10

## 2024-04-10 RX ADMIN — CARVEDILOL 12.5 MG: 12.5 TABLET, FILM COATED ORAL at 09:57

## 2024-04-10 RX ADMIN — RISPERIDONE 0.5 MG: 0.25 TABLET, FILM COATED ORAL at 04:57

## 2024-04-10 RX ADMIN — QUETIAPINE FUMARATE 100 MG: 100 TABLET ORAL at 19:17

## 2024-04-10 RX ADMIN — KETOROLAC TROMETHAMINE 15 MG: 15 INJECTION, SOLUTION INTRAMUSCULAR; INTRAVENOUS at 22:16

## 2024-04-10 RX ADMIN — Medication 400 MG: at 09:57

## 2024-04-10 RX ADMIN — CARVEDILOL 12.5 MG: 12.5 TABLET, FILM COATED ORAL at 19:17

## 2024-04-10 RX ADMIN — MEMANTINE 5 MG: 5 TABLET ORAL at 19:17

## 2024-04-10 RX ADMIN — AMLODIPINE BESYLATE 10 MG: 10 TABLET ORAL at 09:57

## 2024-04-10 RX ADMIN — LISINOPRIL 40 MG: 40 TABLET ORAL at 09:57

## 2024-04-10 RX ADMIN — RISPERIDONE 0.5 MG: 0.25 TABLET, FILM COATED ORAL at 19:17

## 2024-04-10 RX ADMIN — IBUPROFEN 400 MG: 400 TABLET, FILM COATED ORAL at 14:23

## 2024-04-10 RX ADMIN — PANTOPRAZOLE SODIUM 40 MG: 40 TABLET, DELAYED RELEASE ORAL at 05:43

## 2024-04-10 RX ADMIN — TAMSULOSIN HYDROCHLORIDE 0.4 MG: 0.4 CAPSULE ORAL at 09:57

## 2024-04-10 RX ADMIN — TORSEMIDE 5 MG: 10 TABLET ORAL at 10:14

## 2024-04-10 RX ADMIN — ENOXAPARIN SODIUM 40 MG: 100 INJECTION SUBCUTANEOUS at 09:58

## 2024-04-10 RX ADMIN — MEMANTINE 5 MG: 5 TABLET ORAL at 09:57

## 2024-04-10 ASSESSMENT — ENCOUNTER SYMPTOMS
VOMITING: 0
CONSTIPATION: 0
SINUS PAIN: 0
COUGH: 0
SHORTNESS OF BREATH: 0
WHEEZING: 0
PHOTOPHOBIA: 0
NAUSEA: 0
SINUS PRESSURE: 0
DIARRHEA: 0
ABDOMINAL PAIN: 0

## 2024-04-10 ASSESSMENT — PAIN DESCRIPTION - ORIENTATION: ORIENTATION: RIGHT;LEFT

## 2024-04-10 ASSESSMENT — PAIN DESCRIPTION - PAIN TYPE: TYPE: CHRONIC PAIN

## 2024-04-10 ASSESSMENT — PAIN - FUNCTIONAL ASSESSMENT: PAIN_FUNCTIONAL_ASSESSMENT: ACTIVITIES ARE NOT PREVENTED

## 2024-04-10 ASSESSMENT — PAIN DESCRIPTION - DESCRIPTORS: DESCRIPTORS: ACHING

## 2024-04-10 ASSESSMENT — PAIN DESCRIPTION - LOCATION: LOCATION: LEG;SHOULDER

## 2024-04-10 ASSESSMENT — PAIN SCALES - GENERAL: PAINLEVEL_OUTOF10: 8

## 2024-04-11 VITALS
TEMPERATURE: 97.9 F | RESPIRATION RATE: 18 BRPM | SYSTOLIC BLOOD PRESSURE: 165 MMHG | WEIGHT: 137 LBS | HEIGHT: 64 IN | DIASTOLIC BLOOD PRESSURE: 63 MMHG | HEART RATE: 66 BPM | OXYGEN SATURATION: 93 % | BODY MASS INDEX: 23.39 KG/M2

## 2024-04-11 LAB
ANION GAP SERPL CALCULATED.3IONS-SCNC: 5 MMOL/L (ref 9–17)
BUN SERPL-MCNC: 17 MG/DL (ref 8–23)
BUN/CREAT SERPL: 28 (ref 9–20)
CALCIUM SERPL-MCNC: 9 MG/DL (ref 8.6–10.4)
CHLORIDE SERPL-SCNC: 92 MMOL/L (ref 98–107)
CO2 SERPL-SCNC: 36 MMOL/L (ref 20–31)
CREAT SERPL-MCNC: 0.6 MG/DL (ref 0.7–1.2)
GFR SERPL CREATININE-BSD FRML MDRD: >90 ML/MIN/1.73M2
GLUCOSE SERPL-MCNC: 113 MG/DL (ref 70–99)
POTASSIUM SERPL-SCNC: 4.6 MMOL/L (ref 3.7–5.3)
SODIUM SERPL-SCNC: 133 MMOL/L (ref 135–144)

## 2024-04-11 PROCEDURE — 6370000000 HC RX 637 (ALT 250 FOR IP): Performed by: INTERNAL MEDICINE

## 2024-04-11 PROCEDURE — 6360000002 HC RX W HCPCS: Performed by: NURSE PRACTITIONER

## 2024-04-11 PROCEDURE — 6370000000 HC RX 637 (ALT 250 FOR IP): Performed by: PSYCHIATRY & NEUROLOGY

## 2024-04-11 PROCEDURE — 2700000000 HC OXYGEN THERAPY PER DAY

## 2024-04-11 PROCEDURE — 94640 AIRWAY INHALATION TREATMENT: CPT

## 2024-04-11 PROCEDURE — 6370000000 HC RX 637 (ALT 250 FOR IP): Performed by: NURSE PRACTITIONER

## 2024-04-11 PROCEDURE — 99239 HOSP IP/OBS DSCHRG MGMT >30: CPT | Performed by: NURSE PRACTITIONER

## 2024-04-11 PROCEDURE — 80048 BASIC METABOLIC PNL TOTAL CA: CPT

## 2024-04-11 PROCEDURE — 36415 COLL VENOUS BLD VENIPUNCTURE: CPT

## 2024-04-11 RX ORDER — RISPERIDONE 0.5 MG/1
0.5 TABLET ORAL 2 TIMES DAILY
Qty: 60 TABLET | Refills: 3 | Status: SHIPPED | OUTPATIENT
Start: 2024-04-11

## 2024-04-11 RX ORDER — TAMSULOSIN HYDROCHLORIDE 0.4 MG/1
0.4 CAPSULE ORAL DAILY
Qty: 30 CAPSULE | Refills: 3 | Status: SHIPPED | OUTPATIENT
Start: 2024-04-11

## 2024-04-11 RX ORDER — RIVASTIGMINE 4.6 MG/24H
1 PATCH, EXTENDED RELEASE TRANSDERMAL DAILY
Qty: 30 PATCH | Refills: 3 | Status: SHIPPED | OUTPATIENT
Start: 2024-04-11

## 2024-04-11 RX ORDER — LANOLIN ALCOHOL/MO/W.PET/CERES
6 CREAM (GRAM) TOPICAL NIGHTLY
Qty: 60 TABLET | Refills: 3 | Status: SHIPPED | OUTPATIENT
Start: 2024-04-11

## 2024-04-11 RX ORDER — QUETIAPINE FUMARATE 100 MG/1
100 TABLET, FILM COATED ORAL NIGHTLY
Qty: 60 TABLET | Refills: 3 | Status: SHIPPED | OUTPATIENT
Start: 2024-04-11

## 2024-04-11 RX ORDER — CARVEDILOL 12.5 MG/1
12.5 TABLET ORAL 2 TIMES DAILY
Qty: 60 TABLET | Refills: 3 | Status: SHIPPED | OUTPATIENT
Start: 2024-04-11

## 2024-04-11 RX ORDER — TORSEMIDE 10 MG/1
10 TABLET ORAL DAILY
Qty: 30 TABLET | Refills: 3 | Status: SHIPPED | OUTPATIENT
Start: 2024-04-12

## 2024-04-11 RX ORDER — MEMANTINE HYDROCHLORIDE 5 MG/1
5 TABLET ORAL 2 TIMES DAILY
Qty: 60 TABLET | Refills: 3 | Status: SHIPPED | OUTPATIENT
Start: 2024-04-11

## 2024-04-11 RX ORDER — AMLODIPINE BESYLATE 10 MG/1
10 TABLET ORAL DAILY
Qty: 30 TABLET | Refills: 3 | Status: SHIPPED | OUTPATIENT
Start: 2024-04-12

## 2024-04-11 RX ORDER — TORSEMIDE 10 MG/1
10 TABLET ORAL DAILY
Status: DISCONTINUED | OUTPATIENT
Start: 2024-04-11 | End: 2024-04-11 | Stop reason: HOSPADM

## 2024-04-11 RX ORDER — OMEPRAZOLE 20 MG/1
20 CAPSULE, DELAYED RELEASE ORAL DAILY
Qty: 30 CAPSULE | Refills: 3 | Status: SHIPPED | OUTPATIENT
Start: 2024-04-11

## 2024-04-11 RX ADMIN — ENOXAPARIN SODIUM 40 MG: 100 INJECTION SUBCUTANEOUS at 08:26

## 2024-04-11 RX ADMIN — CARVEDILOL 12.5 MG: 12.5 TABLET, FILM COATED ORAL at 08:26

## 2024-04-11 RX ADMIN — PANTOPRAZOLE SODIUM 40 MG: 40 TABLET, DELAYED RELEASE ORAL at 05:14

## 2024-04-11 RX ADMIN — LISINOPRIL 40 MG: 40 TABLET ORAL at 08:25

## 2024-04-11 RX ADMIN — Medication 30 G: at 08:26

## 2024-04-11 RX ADMIN — IBUPROFEN 400 MG: 400 TABLET, FILM COATED ORAL at 08:26

## 2024-04-11 RX ADMIN — RISPERIDONE 0.5 MG: 0.25 TABLET, FILM COATED ORAL at 15:40

## 2024-04-11 RX ADMIN — AMLODIPINE BESYLATE 10 MG: 10 TABLET ORAL at 08:26

## 2024-04-11 RX ADMIN — MEMANTINE 5 MG: 5 TABLET ORAL at 08:25

## 2024-04-11 RX ADMIN — IPRATROPIUM BROMIDE AND ALBUTEROL SULFATE 1 DOSE: .5; 2.5 SOLUTION RESPIRATORY (INHALATION) at 14:32

## 2024-04-11 RX ADMIN — Medication 400 MG: at 08:26

## 2024-04-11 RX ADMIN — TORSEMIDE 10 MG: 10 TABLET ORAL at 08:25

## 2024-04-11 ASSESSMENT — ENCOUNTER SYMPTOMS
CONSTIPATION: 0
DIARRHEA: 0
ABDOMINAL PAIN: 0
VOMITING: 0
WHEEZING: 0
NAUSEA: 0
SINUS PAIN: 0
COUGH: 0
SINUS PRESSURE: 0
PHOTOPHOBIA: 0
SHORTNESS OF BREATH: 0

## 2024-04-11 ASSESSMENT — PAIN SCALES - GENERAL: PAINLEVEL_OUTOF10: 3

## 2024-04-11 ASSESSMENT — PAIN DESCRIPTION - DESCRIPTORS: DESCRIPTORS: ACHING

## 2024-04-11 ASSESSMENT — PAIN DESCRIPTION - LOCATION: LOCATION: LEG

## 2024-04-11 ASSESSMENT — PAIN DESCRIPTION - ORIENTATION: ORIENTATION: RIGHT;LEFT

## 2024-04-11 NOTE — PROGRESS NOTES
Renard Edmonds MD   Urology Progress Note            Subjective: Enlarged prostate urinary retention follow-up    Patient Vitals for the past 24 hrs:   BP Temp Temp src Pulse Resp SpO2 Weight   04/09/24 0407 -- -- -- -- -- -- 59.9 kg (132 lb)   04/08/24 2230 (!) 159/59 -- -- 67 -- -- --   04/08/24 2004 (!) 179/76 99.2 °F (37.3 °C) Oral 71 18 95 % --   04/08/24 1534 135/72 98.2 °F (36.8 °C) Oral 71 18 92 % --   04/08/24 1215 (!) 154/68 -- -- 69 -- -- --   04/08/24 0756 (!) 174/68 97.9 °F (36.6 °C) Oral 71 16 92 % --       Intake/Output Summary (Last 24 hours) at 4/9/2024 0712  Last data filed at 4/8/2024 1750  Gross per 24 hour   Intake 1080 ml   Output --   Net 1080 ml       No results for input(s): \"WBC\", \"HGB\", \"HCT\", \"MCV\", \"PLT\" in the last 72 hours.  Recent Labs     04/07/24  0538 04/08/24  0551 04/09/24  0558   * 134* 134*   K 4.5 4.9 4.5   CL 90* 93* 94*   CO2 38* 38* 35*   BUN 22 16 16   CREATININE 0.6* 0.5* 0.6*       No results for input(s): \"COLORU\", \"PHUR\", \"LABCAST\", \"WBCUA\", \"RBCUA\", \"MUCUS\", \"TRICHOMONAS\", \"YEAST\", \"BACTERIA\", \"CLARITYU\", \"SPECGRAV\", \"LEUKOCYTESUR\", \"UROBILINOGEN\", \"BILIRUBINUR\", \"BLOODU\" in the last 72 hours.    Invalid input(s): \"NITRATE\", \"GLUCOSEUKETONESUAMORPHOUS\"    Additional Lab/culture results:    Physical Exam: Patient up and around in the room, not in acute distress, no complaints of pain, still has frequency and nocturia, no dysuria no bladder pain or distention    Interval Imaging Findings:    Impression:    Patient Active Problem List   Diagnosis    Hyponatremia    COPD, severe (HCC)    Primary hypertension    Tobacco abuse    Major neurocognitive disorder due to another medical condition, with agitation (HCC)       Plan: Monitor voiding symptoms and postvoid residual    Renard Edmonds MD  7:12 AM 4/9/2024  
                                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    Patient Vitals for the past 24 hrs:   BP Temp Temp src Pulse Resp SpO2 Weight   04/02/24 0350 (!) 150/60 97.7 °F (36.5 °C) Oral 69 18 94 % 56.5 kg (124 lb 9.6 oz)   04/01/24 2148 (!) 146/71 -- -- -- -- -- --   04/01/24 2002 (!) 172/60 97.9 °F (36.6 °C) -- 76 16 96 % --   04/01/24 1606 (!) 154/69 97.7 °F (36.5 °C) Oral 62 18 97 % --   04/01/24 1126 (!) 172/79 97.9 °F (36.6 °C) Oral 64 18 95 % --   04/01/24 0719 (!) 163/79 97.9 °F (36.6 °C) Oral 68 18 97 % --   04/01/24 0638 -- -- -- -- -- -- 59.9 kg (132 lb)   04/01/24 0545 (!) 149/64 98 °F (36.7 °C) Oral 86 18 97 % --       Intake/Output Summary (Last 24 hours) at 4/2/2024 0409  Last data filed at 4/1/2024 1835  Gross per 24 hour   Intake 720 ml   Output --   Net 720 ml       No results for input(s): \"WBC\", \"HGB\", \"HCT\", \"MCV\", \"PLT\" in the last 72 hours.  Recent Labs     03/31/24  0056 03/31/24  0650 04/01/24  0505   * 127* 129*   K 4.1 3.7 3.7   CL 85* 88* 90*   CO2 37* 35* 36*   BUN 23 19 17   CREATININE 0.6* 0.5* 0.5*       No results for input(s): \"COLORU\", \"PHUR\", \"LABCAST\", \"WBCUA\", \"RBCUA\", \"MUCUS\", \"TRICHOMONAS\", \"YEAST\", \"BACTERIA\", \"CLARITYU\", \"SPECGRAV\", \"LEUKOCYTESUR\", \"UROBILINOGEN\", \"BILIRUBINUR\", \"BLOODU\" in the last 72 hours.    Invalid input(s): \"NITRATE\", \"GLUCOSEUKETONESUAMORPHOUS\"    Additional Lab/culture results:    Physical Exam: Patient not in acute distress, renal function at baseline creatinine at 0.5 still requiring one-on-one  Patient with dementia we are monitoring postvoid residual because of chronic retention  Interval Imaging Findings:    Impression:    Patient Active Problem List   Diagnosis    Hyponatremia    Acute metabolic encephalopathy    Aspiration pneumonia of both lower lobes (HCC)    COPD, severe (HCC)    Primary hypertension    Chronic respiratory failure 
                                Renard Edmonds MD   Urology Progress Note            Subjective: Follow-up enlarged prostate bladder outlet obstruction    Patient Vitals for the past 24 hrs:   BP Temp Temp src Pulse Resp SpO2 Height   04/05/24 1722 -- -- -- -- -- -- 1.626 m (5' 4\")   04/05/24 0726 (!) 146/77 97.7 °F (36.5 °C) Oral 76 18 94 % --     No intake or output data in the 24 hours ending 04/05/24 1919    Recent Labs     04/04/24  0520   WBC 8.0   HGB 11.8*   HCT 35.5*   MCV 98.3        Recent Labs     04/03/24  0503   *   K 4.0   CL 90*   CO2 38*   BUN 19   CREATININE 0.4*       No results for input(s): \"COLORU\", \"PHUR\", \"LABCAST\", \"WBCUA\", \"RBCUA\", \"MUCUS\", \"TRICHOMONAS\", \"YEAST\", \"BACTERIA\", \"CLARITYU\", \"SPECGRAV\", \"LEUKOCYTESUR\", \"UROBILINOGEN\", \"BILIRUBINUR\", \"BLOODU\" in the last 72 hours.    Invalid input(s): \"NITRATE\", \"GLUCOSEUKETONESUAMORPHOUS\"    Additional Lab/culture results:    Physical Exam: Patient seen in conjunction with nursing staff voiding spontaneously with occasional episodes of incontinence no gross hematuria no dysuria, postvoid residuals have decreased to about 200  Still confused and requiring a sitter  Interval Imaging Findings:    Impression:    Patient Active Problem List   Diagnosis    Hyponatremia    Acute metabolic encephalopathy    Aspiration pneumonia of both lower lobes (HCC)    COPD, severe (HCC)    Primary hypertension    Chronic respiratory failure with hypoxia and hypercapnia (HCC)    Tobacco abuse    Altered mental status    Urinary retention    Major neurocognitive disorder due to another medical condition, with agitation (HCC)       Plan: Patient will require further urologic evaluation once guardianship has been established he has an office appointment scheduled for 2 weeks    Renard Edmonds MD  7:19 PM 4/5/2024  
                                Renard Edmonds MD   Urology Progress Note            Subjective: Follow-up urinary retention    Patient Vitals for the past 24 hrs:   BP Temp Temp src Pulse Resp SpO2 Weight   03/27/24 1438 (!) 136/54 98.1 °F (36.7 °C) Oral 84 16 96 % --   03/27/24 1015 (!) 163/76 -- -- 89 -- -- --   03/27/24 0907 (!) 190/84 98.1 °F (36.7 °C) Oral 93 16 94 % --   03/27/24 0818 -- -- -- -- -- 95 % --   03/27/24 0610 -- -- -- -- -- -- 55.7 kg (122 lb 14.4 oz)   03/26/24 2034 (!) 149/94 98.1 °F (36.7 °C) Oral 98 18 100 % --   03/26/24 1748 (!) 158/69 -- -- 96 -- -- --   03/26/24 1547 (!) 161/71 -- -- 83 -- -- --   03/26/24 1520 (!) 178/87 97.7 °F (36.5 °C) Oral 91 19 92 % --       Intake/Output Summary (Last 24 hours) at 3/27/2024 1446  Last data filed at 3/26/2024 1817  Gross per 24 hour   Intake 700 ml   Output --   Net 700 ml       No results for input(s): \"WBC\", \"HGB\", \"HCT\", \"MCV\", \"PLT\" in the last 72 hours.  Recent Labs     03/25/24  0531 03/26/24  1017 03/27/24  0521   * 130* 130*   K 4.5 3.9 4.2   CL 84* 83* 85*   CO2 42* 43* 42*   BUN 38* 27* 14   CREATININE 0.8 0.6* 0.5*       No results for input(s): \"COLORU\", \"PHUR\", \"LABCAST\", \"WBCUA\", \"RBCUA\", \"MUCUS\", \"TRICHOMONAS\", \"YEAST\", \"BACTERIA\", \"CLARITYU\", \"SPECGRAV\", \"LEUKOCYTESUR\", \"UROBILINOGEN\", \"BILIRUBINUR\", \"BLOODU\" in the last 72 hours.    Invalid input(s): \"NITRATE\", \"GLUCOSEUKETONESUAMORPHOUS\"    Additional Lab/culture results:    Physical Exam: Patient not in acute distress discussed status with nursing staff renal function is stable still has frequency and dribbling and weakness of urinary stream    Interval Imaging Findings:    Impression:    Patient Active Problem List   Diagnosis    Hyponatremia    Acute metabolic encephalopathy    Aspiration pneumonia of both lower lobes (HCC)    COPD, severe (HCC)    Primary hypertension    Chronic respiratory failure with hypoxia and hypercapnia (HCC)    Tobacco abuse    Altered mental 
                                Renard Edmonds MD   Urology Progress Note            Subjective: Follow-up urinary retention bladder outlet obstruction    Patient Vitals for the past 24 hrs:   BP Temp Temp src Pulse Resp SpO2   03/29/24 1143 (!) 148/84 98.1 °F (36.7 °C) Oral 73 18 97 %   03/29/24 0744 (!) 157/70 98.1 °F (36.7 °C) Oral 90 16 94 %   03/29/24 0438 (!) 156/70 -- -- -- -- --   03/29/24 0414 (!) 179/74 97.5 °F (36.4 °C) Oral 77 16 96 %   03/28/24 2042 (!) 149/58 97.9 °F (36.6 °C) Oral 91 17 93 %   03/28/24 1845 (!) 130/52 -- -- -- -- --   03/28/24 1812 (!) 175/65 -- -- -- -- --   03/28/24 1809 (!) 175/65 -- -- 75 -- --   03/28/24 1729 (!) 170/72 -- -- 81 -- --       Intake/Output Summary (Last 24 hours) at 3/29/2024 1645  Last data filed at 3/28/2024 1816  Gross per 24 hour   Intake 1052 ml   Output --   Net 1052 ml       No results for input(s): \"WBC\", \"HGB\", \"HCT\", \"MCV\", \"PLT\" in the last 72 hours.  Recent Labs     03/27/24  0521 03/28/24  0457 03/29/24  0452   * 130* 127*   K 4.2 4.4 3.7   CL 85* 84* 86*   CO2 42* 41* 37*   BUN 14 10 26*   CREATININE 0.5* 0.6* 0.5*       No results for input(s): \"COLORU\", \"PHUR\", \"LABCAST\", \"WBCUA\", \"RBCUA\", \"MUCUS\", \"TRICHOMONAS\", \"YEAST\", \"BACTERIA\", \"CLARITYU\", \"SPECGRAV\", \"LEUKOCYTESUR\", \"UROBILINOGEN\", \"BILIRUBINUR\", \"BLOODU\" in the last 72 hours.    Invalid input(s): \"NITRATE\", \"GLUCOSEUKETONESUAMORPHOUS\"    Additional Lab/culture results:    Physical Exam: Patient sitting in bed, nursing staff at bedside, he is complaining of frequency and voiding small amounts denies any abdominal pain or distention    Interval Imaging Findings:    Impression:    Patient Active Problem List   Diagnosis    Hyponatremia    Acute metabolic encephalopathy    Aspiration pneumonia of both lower lobes (HCC)    COPD, severe (HCC)    Primary hypertension    Chronic respiratory failure with hypoxia and hypercapnia (HCC)    Tobacco abuse    Altered mental status    Urinary retention 
                                Renard Edmonds MD   Urology Progress Note            Subjective: Follow-up urinary retention bladder outlet obstruction    Patient Vitals for the past 24 hrs:   BP Temp Temp src Pulse Resp SpO2 Height Weight   03/26/24 0733 (!) 144/79 98.2 °F (36.8 °C) Axillary 91 18 94 % -- --   03/26/24 0429 -- -- -- -- -- -- -- 56.9 kg (125 lb 8 oz)   03/26/24 0327 (!) 156/87 97.7 °F (36.5 °C) -- (!) 106 -- 99 % -- --   03/26/24 0319 -- -- -- 86 18 96 % -- --   03/25/24 2344 (!) 152/65 98.2 °F (36.8 °C) -- 89 -- 96 % -- --   03/25/24 1955 (!) 164/71 97.5 °F (36.4 °C) -- 80 -- 99 % -- --   03/25/24 1823 -- -- -- -- -- -- 1.626 m (5' 4\") --   03/25/24 1240 111/70 97.5 °F (36.4 °C) -- 82 18 98 % -- --   03/25/24 1100 -- -- -- -- -- 97 % -- --   03/25/24 0912 134/66 97.7 °F (36.5 °C) -- 74 20 96 % -- --   03/25/24 0752 -- -- -- -- -- 91 % -- --       Intake/Output Summary (Last 24 hours) at 3/26/2024 0736  Last data filed at 3/26/2024 0100  Gross per 24 hour   Intake 1321 ml   Output 1300 ml   Net 21 ml       No results for input(s): \"WBC\", \"HGB\", \"HCT\", \"MCV\", \"PLT\" in the last 72 hours.  Recent Labs     03/23/24  0754 03/23/24  1141 03/24/24  0634 03/24/24  0833 03/24/24  1534 03/24/24  1933 03/25/24  0531   *   < > 127*   < > 128* 136 130*   K 3.9  --  3.9  --   --   --  4.5   CL 77*  --  81*  --   --   --  84*   CO2 43*  --  44*  --   --   --  42*   BUN 26*  --  33*  --   --   --  38*   CREATININE 0.8  --  0.8  --   --   --  0.8    < > = values in this interval not displayed.       Recent Labs     03/24/24  0730   COLORU Yellow   PHUR 6.5   WBCUA 0 TO 2   RBCUA None   SPECGRAV 1.010   LEUKOCYTESUR NEGATIVE   UROBILINOGEN Normal   BILIRUBINUR NEGATIVE       Additional Lab/culture results:    Physical Exam: Patient voiding frequently with small amounts, urinalysis showed no evidence of infection, plan was to proceed with cystoscopy and insertion of catheter yesterday but the patient power of 
                                Renard Edmonds MD   Urology Progress Note            Subjective: Follow-up urinary retention bladder outlet obstruction    Patient Vitals for the past 24 hrs:   BP Temp Temp src Pulse Resp SpO2 Height Weight   04/10/24 0333 -- -- -- -- -- -- -- 59 kg (130 lb)   04/09/24 1922 (!) 155/72 97.5 °F (36.4 °C) Oral 76 18 97 % -- --   04/09/24 1910 -- -- -- -- -- 95 % -- --   04/09/24 1603 (!) 167/59 -- -- -- -- -- -- --   04/09/24 1528 (!) 185/71 97.3 °F (36.3 °C) Oral 69 18 95 % -- --   04/09/24 1117 (!) 169/67 97.5 °F (36.4 °C) Oral 63 16 96 % -- --   04/09/24 0930 (!) 175/64 -- -- 67 -- -- -- --   04/09/24 0926 -- -- -- -- -- -- 1.626 m (5' 4\") --   04/09/24 0803 (!) 148/69 97.9 °F (36.6 °C) Oral 81 18 91 % -- --       Intake/Output Summary (Last 24 hours) at 4/10/2024 0607  Last data filed at 4/9/2024 1922  Gross per 24 hour   Intake 720 ml   Output --   Net 720 ml       No results for input(s): \"WBC\", \"HGB\", \"HCT\", \"MCV\", \"PLT\" in the last 72 hours.  Recent Labs     04/08/24  0551 04/09/24  0558   * 134*   K 4.9 4.5   CL 93* 94*   CO2 38* 35*   BUN 16 16   CREATININE 0.5* 0.6*       No results for input(s): \"COLORU\", \"PHUR\", \"LABCAST\", \"WBCUA\", \"RBCUA\", \"MUCUS\", \"TRICHOMONAS\", \"YEAST\", \"BACTERIA\", \"CLARITYU\", \"SPECGRAV\", \"LEUKOCYTESUR\", \"UROBILINOGEN\", \"BILIRUBINUR\", \"BLOODU\" in the last 72 hours.    Invalid input(s): \"NITRATE\", \"GLUCOSEUKETONESUAMORPHOUS\"    Additional Lab/culture results:    Physical Exam: Patient with enlarged prostate, creatinine at baseline, patient carries a postvoid residual but has had no bladder distention or abdominal pain    Interval Imaging Findings:    Impression:    Patient Active Problem List   Diagnosis    Hyponatremia    COPD, severe (HCC)    Primary hypertension    Tobacco abuse    Major neurocognitive disorder due to another medical condition, with agitation (HCC)       Plan: Cystoscopy when authorization can be obtained from power of 
                                Renard Edmonds MD   Urology Progress Note            Subjective: Follow-up urinary retention bladder outlet obstruction    Patient Vitals for the past 24 hrs:   BP Temp Temp src Pulse Resp SpO2 Weight   04/11/24 0338 -- -- -- -- -- -- 62.1 kg (137 lb)   04/10/24 1947 124/61 98.8 °F (37.1 °C) Oral 66 16 95 % --   04/10/24 0741 (!) 162/68 97.7 °F (36.5 °C) Oral 65 18 95 % --       Intake/Output Summary (Last 24 hours) at 4/11/2024 0657  Last data filed at 4/11/2024 0410  Gross per 24 hour   Intake 960 ml   Output --   Net 960 ml       No results for input(s): \"WBC\", \"HGB\", \"HCT\", \"MCV\", \"PLT\" in the last 72 hours.  Recent Labs     04/09/24  0558 04/10/24  0612   * 129*   K 4.5 5.0   CL 94* 91*   CO2 35* 28   BUN 16 18   CREATININE 0.6* 0.6*       No results for input(s): \"COLORU\", \"PHUR\", \"LABCAST\", \"WBCUA\", \"RBCUA\", \"MUCUS\", \"TRICHOMONAS\", \"YEAST\", \"BACTERIA\", \"CLARITYU\", \"SPECGRAV\", \"LEUKOCYTESUR\", \"UROBILINOGEN\", \"BILIRUBINUR\", \"BLOODU\" in the last 72 hours.    Invalid input(s): \"NITRATE\", \"GLUCOSEUKETONESUAMORPHOUS\"    Additional Lab/culture results:    Physical Exam: Patient alert oriented doing well at this time he states he has not had any further dysuria or difficulty with urination, we discussed office follow-up for further urologic management the patient is agreeable    Interval Imaging Findings:    Impression:    Patient Active Problem List   Diagnosis    Hyponatremia    COPD, severe (HCC)    Primary hypertension    Tobacco abuse    Major neurocognitive disorder due to another medical condition, with agitation ([facility])       Plan: We will schedule an office appointment within the next 2 to 3 weeks once guardianship and power of  has been established    Renard Edmonds MD  6:57 AM 4/11/2024  
                                Renard Edmonds MD   Urology Progress Note            Subjective: Follow-up urinary retention bladder outlet obstruction, metabolic encephalopathy    Patient Vitals for the past 24 hrs:   BP Temp Temp src Pulse Resp SpO2 Weight   04/07/24 2054 120/73 97.9 °F (36.6 °C) Oral 91 18 94 % --   04/07/24 1546 (!) 168/58 97.9 °F (36.6 °C) Oral 74 17 94 % --   04/07/24 1134 (!) 153/61 97.3 °F (36.3 °C) Oral 70 17 93 % --   04/07/24 0947 (!) 157/64 -- -- 74 -- -- --   04/07/24 0930 (!) 196/67 -- -- 77 16 93 % --   04/07/24 0805 (!) 211/81 97.9 °F (36.6 °C) Oral 84 16 95 % --   04/07/24 0553 -- -- -- -- -- -- 58 kg (127 lb 14.4 oz)       Intake/Output Summary (Last 24 hours) at 4/8/2024 0447  Last data filed at 4/7/2024 0524  Gross per 24 hour   Intake 400 ml   Output --   Net 400 ml       No results for input(s): \"WBC\", \"HGB\", \"HCT\", \"MCV\", \"PLT\" in the last 72 hours.  Recent Labs     04/06/24  0521 04/07/24  0538   * 131*   K 4.2 4.5   CL 88* 90*   CO2 37* 38*   BUN 15 22   CREATININE <0.4* 0.6*       No results for input(s): \"COLORU\", \"PHUR\", \"LABCAST\", \"WBCUA\", \"RBCUA\", \"MUCUS\", \"TRICHOMONAS\", \"YEAST\", \"BACTERIA\", \"CLARITYU\", \"SPECGRAV\", \"LEUKOCYTESUR\", \"UROBILINOGEN\", \"BILIRUBINUR\", \"BLOODU\" in the last 72 hours.    Invalid input(s): \"NITRATE\", \"GLUCOSEUKETONESUAMORPHOUS\"    Additional Lab/culture results:    Physical Exam: Patient clinically improved, cooperative we are monitoring his voiding symptoms and postvoid residual we discussed with the patient cystoscopy to evaluate the bladder outlet and the enlarged prostate    Interval Imaging Findings:    Impression:    Patient Active Problem List   Diagnosis    Hyponatremia    Acute metabolic encephalopathy    Aspiration pneumonia of both lower lobes (HCC)    COPD, severe (HCC)    Primary hypertension    Chronic respiratory failure with hypoxia and hypercapnia (HCC)    Tobacco abuse    Altered mental status    Urinary retention    Major 
                                Renard Edmonds MD   Urology Progress Note            Subjective: Follow-up urinary retention high postvoid residual    Patient Vitals for the past 24 hrs:   BP Temp Temp src Pulse Resp SpO2 Weight   04/01/24 0638 -- -- -- -- -- -- 59.9 kg (132 lb)   04/01/24 0545 (!) 149/64 98 °F (36.7 °C) Oral 86 18 97 % --   04/01/24 0200 -- -- -- 81 16 96 % --   04/01/24 0135 136/86 97.6 °F (36.4 °C) Axillary 80 18 97 % --   03/31/24 2017 (!) 128/99 98 °F (36.7 °C) Oral 82 16 98 % --   03/31/24 1559 (!) 157/61 97.6 °F (36.4 °C) Oral 71 18 96 % --   03/31/24 1201 (!) 161/67 97.8 °F (36.6 °C) Oral 59 16 99 % --   03/31/24 0719 (!) 164/58 97.7 °F (36.5 °C) Oral 74 18 96 % --       Intake/Output Summary (Last 24 hours) at 4/1/2024 0650  Last data filed at 3/31/2024 1804  Gross per 24 hour   Intake 1080 ml   Output --   Net 1080 ml       No results for input(s): \"WBC\", \"HGB\", \"HCT\", \"MCV\", \"PLT\" in the last 72 hours.  Recent Labs     03/31/24  0056 03/31/24  0650 04/01/24  0505   * 127* 129*   K 4.1 3.7 3.7   CL 85* 88* 90*   CO2 37* 35* 36*   BUN 23 19 17   CREATININE 0.6* 0.5* 0.5*       No results for input(s): \"COLORU\", \"PHUR\", \"LABCAST\", \"WBCUA\", \"RBCUA\", \"MUCUS\", \"TRICHOMONAS\", \"YEAST\", \"BACTERIA\", \"CLARITYU\", \"SPECGRAV\", \"LEUKOCYTESUR\", \"UROBILINOGEN\", \"BILIRUBINUR\", \"BLOODU\" in the last 72 hours.    Invalid input(s): \"NITRATE\", \"GLUCOSEUKETONESUAMORPHOUS\"    Additional Lab/culture results:    Physical Exam: Patient seen in conjunction with nursing staff patient not in acute distress voiding small amounts he carries a high residual by bladder scan but not complaining of any abdominal discomfort    Interval Imaging Findings:    Impression:    Patient Active Problem List   Diagnosis    Hyponatremia    Acute metabolic encephalopathy    Aspiration pneumonia of both lower lobes (HCC)    COPD, severe (HCC)    Primary hypertension    Chronic respiratory failure with hypoxia and hypercapnia (HCC)    
                                Renard Edmonds MD   Urology Progress Note            Subjective: Patient seen in conjunction with nursing staff  Patient awaiting placement   follow-up urinary retention bladder outlet obstruction    Patient Vitals for the past 24 hrs:   BP Temp Temp src Pulse Resp SpO2 Weight   04/04/24 1848 (!) 158/58 98.1 °F (36.7 °C) Oral 77 19 98 % --   04/04/24 1447 (!) 158/58 97.7 °F (36.5 °C) Oral 74 18 98 % --   04/04/24 1116 (!) 171/62 -- -- 66 -- -- --   04/04/24 0947 (!) 160/54 -- -- 62 -- -- --   04/04/24 0744 -- 97.9 °F (36.6 °C) -- -- -- -- --   04/04/24 0743 (!) 154/54 -- Oral 55 18 98 % --   04/04/24 0622 -- -- -- -- -- -- 58.6 kg (129 lb 1.6 oz)   04/04/24 0527 (!) 191/73 -- -- 62 17 96 % --       Intake/Output Summary (Last 24 hours) at 4/5/2024 0407  Last data filed at 4/4/2024 1244  Gross per 24 hour   Intake 590 ml   Output --   Net 590 ml       Recent Labs     04/04/24  0520   WBC 8.0   HGB 11.8*   HCT 35.5*   MCV 98.3        Recent Labs     04/02/24  0519 04/03/24  0503   * 132*   K 3.9 4.0   CL 91* 90*   CO2 40* 38*   BUN 17 19   CREATININE 0.6* 0.4*       No results for input(s): \"COLORU\", \"PHUR\", \"LABCAST\", \"WBCUA\", \"RBCUA\", \"MUCUS\", \"TRICHOMONAS\", \"YEAST\", \"BACTERIA\", \"CLARITYU\", \"SPECGRAV\", \"LEUKOCYTESUR\", \"UROBILINOGEN\", \"BILIRUBINUR\", \"BLOODU\" in the last 72 hours.    Invalid input(s): \"NITRATE\", \"GLUCOSEUKETONESUAMORPHOUS\"    Additional Lab/culture results:    Physical Exam: He is not in acute distress at this time serum creatinine is stable patient voiding small amounts he carries a high postvoid residual    Interval Imaging Findings:    Impression:    Patient Active Problem List   Diagnosis    Hyponatremia    Acute metabolic encephalopathy    Aspiration pneumonia of both lower lobes (HCC)    COPD, severe (HCC)    Primary hypertension    Chronic respiratory failure with hypoxia and hypercapnia (HCC)    Tobacco abuse    Altered mental status    Urinary 
  Physician Progress Note      PATIENT:               ANASTASIYA PULLIAM  CSN #:                  [id number]  :                       1960  ADMIT DATE:       3/22/2024 10:08 AM  DISCH DATE:  RESPONDING  PROVIDER #:        Lukasz Aaron DO          QUERY TEXT:    Pt admitted with aspiration pneumonia.  Pt noted to have COPD and wears 2 L O2   per NC at home. If possible, please document in the progress notes and   discharge summary if you are evaluating and/or treating any of the following:    The medical record reflects the following:    Risk Factors: COPD  Clinical Indicators: documented hx of COPD, dependent on home oxygen  Treatment: 2 L O2 per NC (baseline), Duoneb    Thank you!    Bryson Limon, ROGELION,RN, CRCR  RN Clinical   Options provided:  -- Chronic respiratory failure with hypoxia  -- Chronic respiratory failure with hypercapnia  -- Chronic respiratory failure with hypoxia and hypercapnia  -- Other - I will add my own diagnosis  -- Disagree - Not applicable / Not valid  -- Disagree - Clinically unable to determine / Unknown  -- Refer to Clinical Documentation Reviewer    PROVIDER RESPONSE TEXT:    This patient has chronic respiratory failure with hypoxia and hypercapnia.    Query created by: Bryson Limon on 3/22/2024 4:04 PM      Electronically signed by:  Lukasz Aaron DO 3/23/2024 7:57 AM          
2220:  Patient ambulates out to hallway.  Assisted patient back to bed via wheelchair.  PRN Haldol IM given.  Patient remains on 1:1 at bedside.  Patient snoring in room.    2305:  Patient ambulates out to hallway again.  Assisted patient back to bed again.  PRN Ativan IVP given.  Remains on 1:1.  Will monitor.  
[facility]  Office: [phone]  Ze Fortune DO, Christiano Herrera DO, Alvin Cummings DO, Lukasz Aaron DO, Garrett Turk MD, Myrna Lopez MD, Eugene Fiore MD, Renita Corbin MD,  Jg Hodgson MD, Markel Grace MD, Chapito Wallace MD,  Christi Mota DO, Roxy Manzano MD, Dominick Montoya MD, Irvin Fortune DO, Bonita Briseno MD,  Dale Hampton DO, Kya Aragon MD, Carin Amador MD, Daily De La Torre MD, Harinder Garcia MD,  Salomón Ricks MD, Steven Barrientos MD, Blaire Muro MD, Mora Jacobo MD, Bunny Omer MD, Libra Huerta MD, Ciro Bonds DO, Nino Salazar DO, Garrett Copeland MD,  Yovany Rosales MD, Shirley Waterhouse, CNP,  Lauren Marsh CNP, Carlos Caballero, CNP,  Rossy Gutierrez, DNP, Sakshi Storm, CNP, Racquel Garza, CNP, Barb Zuñiga CNP, Amara Galvan, CNP, Greta Shen, PA-C, Ladonna Gary PA-C, Linnea Catalan, CNP, Georgie Leung, CNP, [place], CNP, Farzaneh Stringer, CNP, Malorie Rico, CNP, Aruna Lamb, CNS, Doris Johnston, CNP, Shabana Kirkpatrick CNP, Tracy Schwab, CNP       [facility]      Daily Progress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 Status: Exceptions  Arousal/Alertness: Delayed responses to stimuli  Following Commands: Follows one step commands with increased time;Follows one step commands consistently  Attention Span: Attends with cues to redirect  Memory: Decreased recall of precautions;Decreased recall of biographical Information;Decreased recall of recent events  Safety Judgement: Decreased awareness of need for assistance;Decreased awareness of need for safety  Problem Solving: Assistance required to identify errors made;Assistance required to implement solutions;Assistance required to generate solutions;Assistance required to correct errors made;Decreased awareness of errors  Insights: Decreased awareness of deficits  Initiation: Requires cues for some  Sequencing: Requires cues for some  Cognition Comment: Pt. impulsive and unaware of deficits with cognition and 
Occupational Therapy  Facility/Department: [facility] PROGRESSIVE CARE  Rehabilitation Occupational Therapy Daily Treatment Note    Date: 3/30/24  Patient Name: Alonzo Crespo       Room: 1009/1009-02  MRN: [id number]  Account: [id number]   : 1960  (63 y.o.) Gender: male     Past Medical History:  has a past medical history of Asthma, COPD (chronic obstructive pulmonary disease) (HCC), Depression, Hernia, Hyperlipidemia, and Hypertension.  Past Surgical History:   has a past surgical history that includes Arm Surgery (Right).    Restrictions  Restrictions/Precautions: General Precautions, Bed Alarm, Fall Risk  Other position/activity restrictions: Telemetry, 3L NC O2, Continuous pulse ox, sitter, 1:1 sitter  Required Braces or Orthoses?: No    Subjective  Subjective: Pt. resting in bed and agreeable for treatment. \"I am GOD\". Pt. often used foul language during treatment requiring redirection back to task at hand.  Restrictions/Precautions: General Precautions;Bed Alarm;Fall Risk  Objective     Cognition  Overall Cognitive Status: Exceptions  Arousal/Alertness: Delayed responses to stimuli  Following Commands: Follows one step commands with increased time;Follows one step commands consistently  Attention Span: Attends with cues to redirect  Memory: Decreased recall of precautions;Decreased recall of biographical Information;Decreased recall of recent events  Safety Judgement: Decreased awareness of need for assistance;Decreased awareness of need for safety  Problem Solving: Assistance required to identify errors made;Assistance required to implement solutions;Assistance required to generate solutions;Assistance required to correct errors made;Decreased awareness of errors  Insights: Decreased awareness of deficits  Initiation: Requires cues for some  Sequencing: Requires cues for some  Cognition Comment: Pt. very quick with all movements/impulsive and unaware of deficits. Pt. often speaking of subjects that do 
Occupational Therapy  Facility/Department: [facility] PROGRESSIVE CARE  Occupational Therapy Initial Assessment    Name: Alonzo Crespo  : 1960  MRN: [id number]  Date of Service: 3/24/2024    RN reports patient is medically stable for therapy treatment this date.    Chart reviewed prior to treatment and patient is agreeable for therapy.  All lines intact and patient positioned comfortably at end of treatment.  All patient needs addressed prior to ending therapy session.      Discharge Recommendations:  24 hour supervision or assist, Patient would benefit from continued therapy after discharge  OT Equipment Recommendations  Equipment Needed:  (CTA)    PER HPI: This 63 yom presents with ams from home. A friend called 911-reportedly due to the confusion. He is unable to give me any specific info as he is somnolent from receiving geodon in ER. Per ER documentation he wears 2L o2 atc for severe copd. He apparently has had some memory problems recently, forgetful.      Patient Diagnosis(es): The primary encounter diagnosis was Altered mental status, unspecified altered mental status type. Diagnoses of Hyponatremia and Chronic obstructive pulmonary disease, unspecified COPD type (HCC) were also pertinent to this visit.  Past Medical History:  has a past medical history of Asthma, COPD (chronic obstructive pulmonary disease) (HCC), Depression, Hernia, Hyperlipidemia, and Hypertension.  Past Surgical History:  has a past surgical history that includes Arm Surgery (Right).    Assessment   Performance deficits / Impairments: Decreased functional mobility ;Decreased safe awareness;Decreased cognition;Decreased ROM;Decreased endurance;Decreased strength;Decreased sensation;Decreased high-level IADLs  Assessment: Pt evaluated on 3/24. Pt limited by decreased cognition, decreased safety awareness, decreased funcitonal mobility, activity tolerance, endurance, strength, and sesnation impacting ADLs/IADLs. Pt demo unsafe behaviors 
Occupational Therapy  [facility]  Occupational Therapy Not Seen    DATE: 3/26/2024    NAME: Alonzo Crespo  MRN: [id number]   : 1960    Patient not seen this date for Occupational Therapy due to:      [x] Cancel by RN or physician due to: (RN Navin reported to hold OT at this time d/t some restlessness/agitation from pt currently).     [] Hemodialysis    [] Critical Lab Value Level     [] Blood transfusion in progress    [] Acute or unstable cardiovascular status   _MAP < 55 or more than >115  _HR < 40 or > 130    [] Acute or unstable pulmonary status   -FiO2 > 60%   _RR < 5 or >40    _O2 sats < 85%    [] Strict Bedrest    [] Off Unit for surgery or procedure    [] Off Unit for testing       [] Pending imaging to R/O fracture    [] Refusal by Patient      [] Other      [] OT being discontinued at this time. Patient independent. No further needs.     [] OT being discontinued at this time as the patient has been transferred to hospice care. No further needs.      Juan Luis Gallegos OLIVIA   
Patient admitted to room 1009. Patient alert to self only, very lethargic after receiving IM geodone in the ER. Chair alarm on. Patient admitted to room. VS taken, telemetry placed and call light given/within reach. Orders released/reviewed, initial assessment completed and navigator started. See chart for more detail.       
Patient becoming agitated & confused.  1:1 at bedside.  Received scheduled Namenda, Seroquel, Exelon patch, and PRN Risperdal at 2025.    2105:  Patient ambulates out to hallway with oxygen per nasal canula.  Assisted back to bed.  PRN Melatonin given.  
Patient currently refusing medication, despite education and encouragement. Writer continues to refuse, alert to self only at this time. Patient continues to call out for his wife. Patient did allow patient to perform assessment. Sitter continued at bedside. Will re-attempt medication pass this afternoon.   
Patient received scheduled Seroquel & Namenda last evening.  Slept for most of the night.  Patient remains on 1:1 for patient safety.  Remains confused, oriented to self & place only.  Patient has unsteady gait to bathroom with x1 assist.  Patient c/o nausea treated with PRN Zofran which was effective.  Will monitor.  
Patient was up ambulating in [place], agitated and trying to leave. He removed O2 and was difficult to redirect. Security notified of combative behavior and one-to-one requested for day shift. Patient redirected to chair. He has call light in reach and telesitter in place. Safety maintained.  
Physical Therapy  DATE: 3/26/2024    NAME: Alonzo Crespo  MRN: [id number]   : 1960    Patient not seen this date for Physical Therapy due to:      [x] Cancel by RN or physician due to: (RN Navin reported to hold PT at this time d/t some restlessness/agitation from pt currently).     [] Hemodialysis    [] Critical Lab Value Level     [] Blood transfusion in progress    [] Acute or unstable cardiovascular status   _MAP < 55 or more than >115  _HR < 40 or > 130    [] Acute or unstable pulmonary status   -FiO2 > 60%   _RR < 5 or >40    _O2 sats < 85%    [] Strict Bedrest    [] Off Unit for surgery or procedure    [] Off Unit for testing       [] Pending imaging to R/O fracture    [] Refusal by Patient      [] Other      [] PT being discontinued at this time. Patient independent. No further needs.     [] PT being discontinued at this time as the patient has been transferred to hospice care. No further needs.      Dorota Cannon, PTA     
Physical Therapy  DATE: 3/29/2024    NAME: Alonzo Crespo  MRN: [id number]   : 1960    Patient not seen this date for Physical Therapy due to:      [] Cancel by RN or physician due to:    [] Hemodialysis    [] Critical Lab Value Level     [] Blood transfusion in progress    [] Acute or unstable cardiovascular status   _MAP < 55 or more than >115  _HR < 40 or > 130    [] Acute or unstable pulmonary status   -FiO2 > 60%   _RR < 5 or >40    _O2 sats < 85%    [] Strict Bedrest    [] Off Unit for surgery or procedure    [] Off Unit for testing       [] Pending imaging to R/O fracture    [] Refusal by Patient      [x] Other Pt. Not appropriate for treatment d/t unable to follow commands and inappropriate with staff. OT will cont to follow.                            [] PT being discontinued at this time. Patient independent. No further needs.     [] PT being discontinued at this time as the patient has been transferred to hospice care. No further needs.      Dorota Cannon, PTA     
Physical Therapy  Facility/Department: [facility] PROGRESSIVE CARE  Daily Treatment Note  NAME: Alonzo Crespo  : 1960  MRN: [id number]    Date of Service: 2024    AMADOU Valadez reports patient is medically stable for therapy treatment this date.    Chart reviewed prior to treatment and patient is agreeable for therapy.  All lines intact and patient positioned comfortably at end of treatment.  All patient needs addressed prior to ending therapy session.     Discharge Recommendations:  24 hour supervision or assist   PT Equipment Recommendations  Equipment Needed: No    Patient Diagnosis(es): The primary encounter diagnosis was Altered mental status, unspecified altered mental status type. Diagnoses of Hyponatremia and Chronic obstructive pulmonary disease, unspecified COPD type (HCC) were also pertinent to this visit.    Assessment   Assessment: 64 y/o male referred to PT with cognitive impairments. Patient demonstrating impulsive, inappropriate behaviors and intermittently verbalizing sexual comments towards therapist throughout session. Patient ambulating without AD at increased jazzmine requiring cues to \"slow down\" resulting in patient demonstrating exaggerated decreased jazzmine/speed. Patient demonstrate ability to  ambulate ~100' without AD and without SOB and then patient preferred to return to bed. Therapist unable to redirect. Unable to attempt 6 minute walk test secondary to patient behaviors/AMS.  Recommend discontinuing PT this date due to max potential with functional mobility tasks secondary to patient behaviors/AMS.  Patient would benefit from 24/7 SUP at d/c during mobility tasks secondary to poor safety awareness and impulsive behaviors.  Activity Tolerance: Treatment limited secondary to decreased cognition  Equipment Needed: No     Plan    Physical Therapy Plan  General Plan: 5-7 times per week  Current Treatment Recommendations: Strengthening;Functional mobility training;Transfer 
Physical Therapy  Facility/Department: [facility]  Rehabilitation Physical Therapy Treatment Note    NAME: Alonzo Crespo  : 1960 (63 y.o.)  MRN: [id number]  CODE STATUS: Full Code    Date of Service: 3/28/24     Pt currently functioning below baseline.  Recommend daily inpatient skilled therapy at time of discharge to maximize long term outcomes and prevent re-admission. Please refer to AM-PAC score for current level of function.     Restrictions:  Restrictions/Precautions: General Precautions, Bed Alarm, Fall Risk  Position Activity Restriction  Other position/activity restrictions: Telemetry, 3L NC O2, Continuous pulse ox, sitter, 1:1 sitter     SUBJECTIVE  Subjective  Subjective: Patient up in side chair upon therapists arrival.  Patient has a 1:1 sitter present. RN reports patient is medically stable for therapy treatment this date.    Chart reviewed prior to treatment and patient is agreeable for therapy.  All lines intact and patient positioned comfortably at end of treatment.  All patient needs addressed prior to ending therapy session.  Chair alarm in place and tested to ensure patient safety. Patient with tangical speech states \"we are in Heaven\" \" you are god\". Patient upon completion of therapy treatment makes multiple attempts at kissing therapists and attempts to lick JOHN[place]ON.    OBJECTIVE  Cognition  Overall Cognitive Status: Exceptions  Following Commands: Follows one step commands with increased time;Follows one step commands consistently  Attention Span: Attends with cues to redirect  Memory: Decreased recall of precautions;Decreased recall of biographical Information;Decreased recall of recent events  Safety Judgement: Decreased awareness of need for assistance;Decreased awareness of need for safety  Problem Solving: Assistance required to identify errors made;Assistance required to implement solutions;Assistance required to generate solutions;Assistance required to correct errors 
Physical Therapy  Facility/Department: [facility]  Rehabilitation Physical Therapy Treatment Note    NAME: Alonzo Crespo  : 1960 (63 y.o.)  MRN: [id number]  CODE STATUS: Full Code    Date of Service: 3/30/24     Pt currently functioning below baseline.  Recommend daily inpatient skilled therapy at time of discharge to maximize long term outcomes and prevent re-admission. Please refer to AM-PAC score for current level of function.     Restrictions:  Restrictions/Precautions: General Precautions, Bed Alarm, Fall Risk  Position Activity Restriction  Other position/activity restrictions: Telemetry, 3L NC O2, Continuous pulse ox, sitter, 1:1 sitter     SUBJECTIVE  Subjective  Subjective: Patient up in bed upon therapists arrival. RN reports patient is medically stable for therapy treatment this date.    Chart reviewed prior to treatment and patient is agreeable for therapy.  All lines intact and patient positioned comfortably at end of treatment.  All patient needs addressed prior to ending therapy session.    OBJECTIVE  Cognition  Overall Cognitive Status: Exceptions  Arousal/Alertness: Delayed responses to stimuli  Following Commands: Follows one step commands with increased time;Follows one step commands consistently  Attention Span: Attends with cues to redirect  Memory: Decreased recall of precautions;Decreased recall of biographical Information;Decreased recall of recent events  Safety Judgement: Decreased awareness of need for assistance;Decreased awareness of need for safety  Problem Solving: Assistance required to identify errors made;Assistance required to implement solutions;Assistance required to generate solutions;Assistance required to correct errors made;Decreased awareness of errors  Insights: Decreased awareness of deficits  Initiation: Requires cues for some  Sequencing: Requires cues for some  Cognition Comment: Pt. very quick with all movements/impulsive and unaware of deficits. Pt. 
[facility]  Office: [phone]  Ze Fortune DO, Christiano Herrera DO, Alvin Cummings DO, Lukasz Aaron DO, Garrett Turk MD, Myrna Lopez MD, Eugene Fiore MD, Renita Corbin MD,  Jg Hodgson MD, Markel Grace MD, Chapito Wallace MD,  Christi Mota DO, Roxy Manzano MD, Dominick Montoya MD, Irvin Fortune DO, Bonita Briseno MD,  Dale Hampton DO, Kya Aragon MD, Carin Amador MD, Daily De La Torre MD, Harinder Garcia MD,  Salomón Ricks MD, Steven Barrientos MD, Blaire Muro MD, Mora Jacobo MD, Bunny Omer MD, Libra Huerta MD, Ciro Bonds DO, Nino Salazar DO, Garrett Copeland MD,  Yovany Rosales MD, Shirley Waterhouse, CNP,  Lauren Marsh CNP, Carlos Caballero, CNP,  Rossy Gutierrez, DNP, Sakshi Storm, CNP, Racquel Garza, CNP, Malorie Rico CNP, Barb Zuñiga, CNP, Amara Galvan, CNP, Greta Shen, PA-C, Ladonna Gary, PA-C, Linnea Catalan, CNP, Georgie Leung, CNP, Farzaneh Stringer, CNP, Aruna Lamb, CNS, Doris Johnston, CNP, Shabana Kirkpatrick, CNP, Tracy Schwab, CNP         [facility]   IN-PATIENT SERVICE   [facility]    Progress Note    4/9/2024    10:16 AM    Name:   Alonzo Crespo  MRN:     [id number]     Acct:      [id number]   Room:   2009/2009-02  IP Day:  18  Admit Date:  3/22/2024 10:08 AM    PCP:   Ana Méndez  Code Status:  Full Code    Subjective:     C/C:   Chief Complaint   Patient presents with    Altered Mental Status     Interval History Status: not changed.     Not as agitated    No new issues    No insight into illness  Brief History:     This 63 yom presents with ams from home. A friend called 911-reportedly due to the confusion. He is unable to give me any specific info as he is somnolent from receiving geodon in ER. Per ER documentation he wears 2L o2 atc for severe copd. He apparently has had some memory problems recently, forgetful.     Review of Systems:     unobtainable      Medications:     Allergies:  
[facility]  Office: [phone]  Ze Fortune DO, Christiano Herrera DO, Alvin Cummings DO, Lukasz Aaron DO, Garrett Turk MD, Myrna Lopez MD, Eugene Fiore MD, Renita Corbin MD,  Jg Hodgson MD, Markel Grace MD, Chapito Wallace MD,  Christi Mota DO, Roxy Manzano MD, Dominick Montoya MD, Irvin Fortune DO, Bonita Briseno MD,  Dale Hampton DO, Kya Aragon MD, Carin Amador MD, Daily De La Torre MD, Harinder Garcia MD,  Salomón Ricks MD, Steven Barrientos MD, Blaire Muro MD, Mora Jacobo MD, Bunny Omer MD, Libra Huerta MD, Ciro Bonds DO, Nino Salazar DO, Garrett Copeland MD,  Yovany Rosales MD, Shirley Waterhouse, CNP,  Lauren Marsh CNP, Carlos Caballero, CNP,  Rossy Gutierrez, DNP, Sakshi Storm, CNP, Racquel Garza, CNP, Barb Zuñiga CNP, Amara Galvan, CNP, Greta Shen, PA-C, Ladonna Gary PA-C, Linnea Catalan, CNP, Georgie Leung, CNP, [place], CNP, Farzaneh Stringer, CNP, Malorie Rico, CNP, Aruna Lamb, CNS, Doris Johnston, CNP, Shabana Kirkpatrick CNP, Tracy Schwab, CNP         [facility]   IN-PATIENT SERVICE   [facility]    Progress Note    4/11/2024    9:57 AM    Name:   Alonzo Crespo  MRN:     [id number]     Acct:      [id number]   Room:   2009/2009-02  IP Day:  20  Admit Date:  3/22/2024 10:08 AM    PCP:   Ana Méndez  Code Status:  Full Code    Subjective:     C/C:   Chief Complaint   Patient presents with    Altered Mental Status     Interval History Status: not changed.     Patient stable.  Can be discharged once bed placement is arranged.    Brief History:     3/22 - This 63 yom presents with ams from home. A friend called 911-reportedly due to the confusion. He is unable to give me any specific info as he is somnolent from receiving geodon in ER. Per ER documentation he wears 2L o2 atc for severe copd. He apparently has had some memory problems recently, forgetful.     3/23-4/10 -long and complicated 
Pt continually taking off O2 and verbally aggressive with any type of medical care. Patient continues to de-sat and does not listen to any medical advice.   
Pt discharged to [facility] in stable condition with belongings  Pt denies having any further questions at this time  Personal items given to patient at discharge  Patient/family state they have everything they were admitted with.  Report given to EMS and Ayah @ [facility].  
Pt is refusing to wear the non skid footwear. Has his socks on from home and was educated on the safety risks. Pt also refusing to answer orientation questions and states they are stupid. Care ongoing and sitter still in place.   
Pt remained overall calm and cooperative, restless at times, but redirectable. Pt remained on 3L NC and up with stby assist for restroom needs. Sitter remains at bedside. Pt received PRN melatonin 3 mg and 0.5 mg risperidol at 2131. Will continue with care plan  
Pt returned from CT  
Pt taken to CT  
Pt transferred to Med Surg, writer gave report to nurse taking pt. Belongings gathered and taken with pt, 1:1 sitter went with pt. All questions and concerns addressed. Safety maintained.    
RN messaged Waterhouse NP asking if pt is able to keep IV out. NP states yes. Order entered.    2300 Upon entering patient room RN found pt's glasses on floor and broken. RN put glasses on counter.  
Reason for Follow up:   Hyponatremia  Hypertension  Metabolic encephalopathy and altered mental status  Dementia  Urinary tract infection    HISTORY:    Patient was seen and examined discussed with RN patient is feeling better no fever chills rigors no nausea vomiting headache dysuria hematuria his mental status has improved.  Electrolytes show further improvement in sodium to 134 his blood pressure remains elevated denies any swelling edema PND orthopnea    Review Of Systems:   Constitutional: No fever, chills, lethargy, weakness or wt loss.  Cardiac:  No chest pain, dyspnea, orthopnea or PND.  Pulmonary:  No cough, phlegm or wheezing.  Abdomen:  No abdominal pain, nausea, vomiting or diarrhea.  :   No hematuria, pyuria, dysuria or flank pain.  Extremities:  No swelling or joint pains.      Review Of Systems:   Constitutional: No fever, chills, lethargy, weakness or wt loss.  HEENT:  No headache, nasal discharge or sore throat.  Cardiac:  No chest pain, dyspnea, orthopnea or PND.  Pulmonary:  No cough, phlegm or wheezing.  Abdomen:  No abdominal pain, nausea, vomiting or diarrhea.  Neuro:  No gross focal weakness, numbness, abnormal movements or seizure like activity.  Skin:   No rashes or itching.  :   No hematuria, pyuria, dysuria or flank pain.  Extremities:  No swelling or joint pains.  Endocrine: No polyuria, polydypsia, or thyroid problems.  Hematology:    No bleeding disorders, bruising or anemia.  All other ROS is negative.  Unable to obtain because he is intubated and sedated.     Scheduled Meds:   carvedilol  12.5 mg Oral BID    lisinopril  40 mg Oral Daily    QUEtiapine  50 mg Oral Nightly    melatonin  6 mg Oral Once    amLODIPine  10 mg Oral Daily    memantine  5 mg Oral BID    tamsulosin  0.4 mg Oral Daily    rivastigmine  1 patch TransDERmal Daily    urea  30 g Oral Daily    magnesium oxide  400 mg Oral Daily    pantoprazole  40 mg Oral QAM AC    sodium chloride flush  5-40 mL IntraVENous 2 times 
Reason for Follow up: Hypertension and hyponatremia    HISTORY:        No complaints anxious to go home      All other ROS is negative.      Scheduled Meds:   QUEtiapine  50 mg Oral Nightly    melatonin  6 mg Oral Once    lisinopril  20 mg Oral Daily    carvedilol  6.25 mg Oral BID    amLODIPine  10 mg Oral Daily    memantine  5 mg Oral BID    tamsulosin  0.4 mg Oral Daily    rivastigmine  1 patch TransDERmal Daily    urea  30 g Oral Daily    magnesium oxide  400 mg Oral Daily    pantoprazole  40 mg Oral QAM AC    sodium chloride flush  5-40 mL IntraVENous 2 times per day    enoxaparin  40 mg SubCUTAneous Daily   Continuous Infusions:   sodium chloride       PRN Meds:hydrALAZINE, labetalol, risperiDONE, albuterol sulfate HFA, sodium chloride flush, sodium chloride, potassium chloride **OR** potassium alternative oral replacement **OR** potassium chloride, magnesium sulfate, ondansetron **OR** ondansetron, polyethylene glycol, acetaminophen **OR** acetaminophen, ipratropium 0.5 mg-albuterol 2.5 mg    Allergies   Allergen Reactions    Pcn [Penicillins]      Pt tolerated ceftriaxone during 3/22/2024 admission.        Physical Exam:  Blood pressure (!) 157/64, pulse 74, temperature 97.9 °F (36.6 °C), temperature source Oral, resp. rate 16, height 1.626 m (5' 4\"), weight 58 kg (127 lb 14.4 oz), SpO2 93 %.  In: 400 [P.O.:400]  Out: -   In: 400   Out: -     General:  Awake,  not in distress. Appears to be stated age.  HEENT: Atraumatic, normocephalic.   Chest: Minimal expiratory wheezing  Cardiovascular: RRR, S1S2, no murmur, rub or gallop. No lower extremity edema.    Abdomen: Soft, non tender to palpation.   Musculoskeletal: Active ROM x 4 extremities. No cyanosis or clubbing.  Integumentary: Pink, warm and dry. Free from rash or lesions. Skin turgor normal.  CNS: No involuntary movement    Data:  CBC:   Lab Results   Component Value Date    WBC 8.0 04/04/2024    HGB 11.8 (L) 04/04/2024    HCT 35.5 (L) 04/04/2024    MCV 
Reason for Follow up: Hyponatremia  Metabolic encephalopathy  COPD  History of depression  Hypertension    HISTORY:    Patient was seen and examined discussed with RN.  Patient is more awake and alert no headache nausea vomiting patient remains on sodium chloride tablet current sodium has improved 130 mEq    Review Of Systems:   Constitutional: No fever, chills, lethargy, weakness or wt loss.  Cardiac:  No chest pain, dyspnea, orthopnea or PND.  Pulmonary:  No cough, phlegm or wheezing.  Abdomen:  No abdominal pain, nausea, vomiting or diarrhea.  :   No hematuria, pyuria, dysuria or flank pain.  Extremities:  No swelling or joint pains.      Review Of Systems:   Constitutional: No fever, chills, lethargy, weakness or wt loss.  HEENT:  No headache, nasal discharge or sore throat.  Cardiac:  No chest pain, dyspnea, orthopnea or PND.  Pulmonary:  No cough, phlegm or wheezing.  Abdomen:  No abdominal pain, nausea, vomiting or diarrhea.  Neuro:  No gross focal weakness, numbness, abnormal movements or seizure like activity.  Skin:   No rashes or itching.  :   No hematuria, pyuria, dysuria or flank pain.  Extremities:  No swelling or joint pains.  Endocrine: No polyuria, polydypsia, or thyroid problems.  Hematology:    No bleeding disorders, bruising or anemia.  All other ROS is negative.  Unable to obtain because he is intubated and sedated.     Scheduled Meds:   tamsulosin  0.4 mg Oral Daily    levoFLOXacin  750 mg Oral Daily    ipratropium 0.5 mg-albuterol 2.5 mg  1 Dose Inhalation BID RT    sodium chloride  1 g Oral TID WC    LORazepam  1 mg IntraVENous Once    rivastigmine  1 patch TransDERmal Daily    urea  30 g Oral Daily    magnesium oxide  400 mg Oral Daily    pantoprazole  40 mg Oral QAM AC    sodium chloride flush  5-40 mL IntraVENous 2 times per day    enoxaparin  40 mg SubCUTAneous Daily   Continuous Infusions:   sodium chloride       PRN Meds:LORazepam, albuterol sulfate HFA, sodium chloride flush, 
Reason for Follow up: Hyponatremia/metabolic alkalosis    Subjective:      No new complaints    Review Of Systems:   No chest pain or GI symptoms    Scheduled Meds:   lisinopril  5 mg Oral Daily    amLODIPine  10 mg Oral Daily    metoprolol tartrate  100 mg Oral BID    QUEtiapine  25 mg Oral Nightly    memantine  5 mg Oral BID    tamsulosin  0.4 mg Oral Daily    LORazepam  1 mg IntraVENous Once    rivastigmine  1 patch TransDERmal Daily    urea  30 g Oral Daily    magnesium oxide  400 mg Oral Daily    pantoprazole  40 mg Oral QAM AC    sodium chloride flush  5-40 mL IntraVENous 2 times per day    enoxaparin  40 mg SubCUTAneous Daily     Continuous Infusions:   sodium chloride       PRN Meds:risperiDONE, albuterol sulfate HFA, sodium chloride flush, sodium chloride, potassium chloride **OR** potassium alternative oral replacement **OR** potassium chloride, magnesium sulfate, ondansetron **OR** ondansetron, polyethylene glycol, acetaminophen **OR** acetaminophen, ipratropium 0.5 mg-albuterol 2.5 mg, melatonin    Allergies   Allergen Reactions    Pcn [Penicillins]      Pt tolerated ceftriaxone during 3/22/2024 admission.        Physical Exam:    Vitals:    04/02/24 1904 04/03/24 0151 04/03/24 0517 04/03/24 0802   BP: (!) 158/68 (!) 148/63  (!) 181/61   Pulse: 77 57 63 62   Resp: 18 17 16 16   Temp: 97.9 °F (36.6 °C) 97.5 °F (36.4 °C)  98.2 °F (36.8 °C)   TempSrc: Oral Oral     SpO2: 91% 94% 95% 96%   Weight:       Height:         I/O last 3 completed shifts:  In: 830 [P.O.:830]  Out: -     PHYSICAL EXAM:  Patient is no acute distress,  Awake, disoriented.  Neck veins nondistended.  Mildly hyperinflated chest with diffusely and expiratory wheezing.  S1-S2 regular.  , no peripheral edema    CBC:   Lab Results   Component Value Date    WBC 22.1 (H) 03/23/2024    HGB 13.5 03/23/2024    HCT 39.1 (L) 03/23/2024    MCV 94.0 03/23/2024     03/23/2024         CMP:   Lab Results   Component Value Date/Time     
Reason for Follow up: Hyponatremia/metabolic alkalosis    Subjective:      Patient has no complaints, still has  confusion requiring sitter denies any breathing problems    Review Of Systems:   No chest pain or GI symptoms    Scheduled Meds:   metoprolol tartrate  100 mg Oral BID    QUEtiapine  25 mg Oral Nightly    memantine  5 mg Oral BID    tamsulosin  0.4 mg Oral Daily    LORazepam  1 mg IntraVENous Once    rivastigmine  1 patch TransDERmal Daily    urea  30 g Oral Daily    magnesium oxide  400 mg Oral Daily    pantoprazole  40 mg Oral QAM AC    sodium chloride flush  5-40 mL IntraVENous 2 times per day    enoxaparin  40 mg SubCUTAneous Daily     Continuous Infusions:   sodium chloride Stopped (03/30/24 0909)    sodium chloride       PRN Meds:hydrALAZINE, risperiDONE, albuterol sulfate HFA, sodium chloride flush, sodium chloride, potassium chloride **OR** potassium alternative oral replacement **OR** potassium chloride, magnesium sulfate, ondansetron **OR** ondansetron, polyethylene glycol, acetaminophen **OR** acetaminophen, ipratropium 0.5 mg-albuterol 2.5 mg, melatonin    Allergies   Allergen Reactions    Pcn [Penicillins]      Pt tolerated ceftriaxone during 3/22/2024 admission.        Physical Exam:    Vitals:    03/30/24 1904 03/30/24 2320 03/31/24 0408 03/31/24 0719   BP: (!) 179/69 (!) 133/52 (!) 143/59 (!) 164/58   Pulse: 80 61 72 74   Resp: 18 18 18 18   Temp: 98.1 °F (36.7 °C) 98 °F (36.7 °C) 97.8 °F (36.6 °C) 97.7 °F (36.5 °C)   TempSrc: Oral Oral Oral Oral   SpO2: 100% 99% 97% 96%   Weight:       Height:         I/O last 3 completed shifts:  In: -   Out: 625 [Urine:625]    PHYSICAL EXAM:  Patient is no acute distress,  Awake, disoriented.  Neck veins nondistended.  Mildly hyperinflated chest with diffusely diminished breath sounds.  Muffled S1-S2 regular.  Abdomen is soft and nontender, no peripheral edema    CBC:   Lab Results   Component Value Date    WBC 22.1 (H) 03/23/2024    HGB 13.5 03/23/2024 
Reason for Follow up: Hyponatremia/metabolic alkalosis    Subjective:    Alonzo seen and evaluated.  He is still requiring a one-on-one sitter.  He is mildly confused and disoriented.  Using Exelon patch.  The patient is on a 1200 cc fluid restriction and initially had both urea and salt tablets administered.  Still has a bedside PVR in excess of 200 cc.    Review Of Systems:   Denies current headache photophobia active chest pain or palpitations or worsening shortness of breath.  Uses home O2.  Denies abdominal pain or distention.  Denies any worsening leg pain or swelling.    Scheduled Meds:   metoprolol tartrate  75 mg Oral BID    memantine  5 mg Oral BID    tamsulosin  0.4 mg Oral Daily    levoFLOXacin  750 mg Oral Daily    LORazepam  1 mg IntraVENous Once    rivastigmine  1 patch TransDERmal Daily    urea  30 g Oral Daily    magnesium oxide  400 mg Oral Daily    pantoprazole  40 mg Oral QAM AC    sodium chloride flush  5-40 mL IntraVENous 2 times per day    enoxaparin  40 mg SubCUTAneous Daily     Continuous Infusions:   sodium chloride       PRN Meds:hydrALAZINE, risperiDONE, albuterol sulfate HFA, sodium chloride flush, sodium chloride, potassium chloride **OR** potassium alternative oral replacement **OR** potassium chloride, magnesium sulfate, ondansetron **OR** ondansetron, polyethylene glycol, acetaminophen **OR** acetaminophen, ipratropium 0.5 mg-albuterol 2.5 mg, melatonin    Allergies   Allergen Reactions    Pcn [Penicillins]      Pt tolerated ceftriaxone during 3/22/2024 admission.        Physical Exam:    Vitals:    03/28/24 1845 03/28/24 2042 03/29/24 0414 03/29/24 0438   BP: (!) 130/52 (!) 149/58 (!) 179/74 (!) 156/70   Pulse:  91 77    Resp:  17 16    Temp:  97.9 °F (36.6 °C) 97.5 °F (36.4 °C)    TempSrc:  Oral Oral    SpO2:  93% 96%    Weight:       Height:         I/O last 3 completed shifts:  In: 1292 [P.O.:1292]  Out: -     PHYSICAL EXAM:  Patient is seated no acute distress currently on 3 L 
SPIRITUAL CARE DEPARTMENT [facility]  PROGRESS NOTE    Room # 2009/2009-02   Name: Alonzo Crespo            [facility]: Unknown     Reason for visit:  Rounding    I visited the patient.    Admit Date & Time: 3/22/2024 10:08 AM    Assessment:  Alonzo Crespo is a 63 y.o. male in the hospital because Major neurocognitive disorder due to another medical condition, with agitation (HCC). Upon entering the room the patient was dressed in civilian clothes, and sitting up in the bed.      Intervention:  I introduced myself and my title as  I offered space for the patient  to express feelings, needs, and concerns and provided a ministry presence. The  mentioned the fact that she just saw the patient up at the nurses station. The patient agreed that it was him, and discoursed a minute concerning that situation. The  observed that the patient was fully dressed and asked him if he was waiting to be discharged. The patient agreed that he was waiting to be discharged. The  asked if she could pray for him before he left, and he said, \"No sweetheart..I am good\". The  said, \"Ok, but maybe if we pray; you can get discharged soon\". The patient, and  laughed, and the patient agreed to be prayed for; therefore, the  prayed for his quick discharge. The patient, and  laughed for a minute, and dialogued briefly before the  left the room with uplifting words for the patient.      Outcome:  The patient was glad the  stopped by to visit, and pray.    Plan:  The patient is being discharged; no further action necessary at this time.     Electronically signed by Chaplain Jessica, on 4/9/2024 at 2:21 PM.  Spiritual Care Department  [facility]     
SPIRITUAL CARE DEPARTMENT [facility]  PROGRESS NOTE    Room # 1009/1009-02   Name: Alonzo Crespo              Reason for visit: Routine    I visited the patient.    Admit Date & Time: 3/22/2024 10:08 AM    Assessment:  Alonzo Crespo is a 63 y.o. male.  Upon entering the room patient states about their medical condition, states struggles with their medical situation. States worries, fears frustrations. Patient states well , treated well. Patient states good family support, shares about spiritual life, Mormonism background, shares Mormonism beliefs. Patient shares about outside interests.    Intervention:   provided a ministry presence, listening and prayer.    Outcome:  Patient open to visit.     Plan:  Chaplains will remain available to offer spiritual and emotional support as needed.    Electronically signed by Chaplain Angel, on 3/30/2024 at 1:59 PM.  Spiritual Care Department  [facility]      03/30/24 1358   Encounter Summary   Service Provided For: Patient   Referral/Consult From: Edy   Last Encounter  03/30/24   Complexity of Encounter Low   Begin Time 1230   End Time  1235   Total Time Calculated 5 min   Assessment/Intervention/Outcome   Assessment Calm;Coping   Intervention Prayer (assurance of)/[place];Sustaining Presence/Ministry of presence   Outcome Engaged in conversation;Expressed regrets;Receptive       
[facility]  Office: [phone]  Ze Fortune DO, Christiano Herrera DO, Alvin Cummings DO, Lukasz Aaron DO, Garrett Turk MD, Myrna Lopez MD, Eugene Fiore MD, Renita Corbin MD,  Jg Hodgson MD, Markel Grace MD, Chapito Wallace MD,  Christi Mota DO, Roxy Manzano MD, Dominick Montoya MD, Irvin Fortune DO, Bonita Briseno MD,  Dale Hampton DO, Kya Aragon MD, Carin Amador MD, Daily De La Torre MD, Harinder Garcia MD,  Salomón Ricks MD, Steven Barrientos MD, Blaire Muro MD, Mora Jacobo MD, Bunny Omer MD, Libra Huerta MD, Ciro Bonds DO, Nino Salazar DO, Garrett Copeland MD,  Yovany Rosales MD, Shirley Waterhouse, CNP,  Lauren Marsh CNP, Carlos Caballero, CNP,  Rossy Gutierrez, DNP, Sakshi Storm, CNP, Racquel Garza, CNP, Barb Zuñiga, CNP, Amara Galvan, CNP, Greta Shen, PA-C, Ladonna Gary, PA-C, Linnea Catalan, CNP, Georgie Leung, CNP, [place], CNP, Farzaneh Stringer, CNP, Malorie Rico, CNP, Aruna Lamb, CNS, Doris Johnston, CNP, Shabana Kirkpatrick CNP, Tracy Schwab, CNP         [facility]   IN-PATIENT SERVICE   [facility]    Progress Note    3/29/2024    9:22 AM    Name:   Alonzo Crespo  MRN:     [id number]     Acct:      [id number]   Room:   1009/1009-02   Day:  7  Admit Date:  3/22/2024 10:08 AM    PCP:   Ana Méndez  Code Status:  Full Code    Subjective:     C/C:   Chief Complaint   Patient presents with    Altered Mental Status     Interval History Status: not changed.     Patient with altered mental status, dementia.  Impulsive behavior.  Denied chest pain, shortness of breath, nausea or vomiting or other acute complaints.  Requesting a cigarette.    Brief History:     This is a 63-year-old male who presents with a complaint of altered mental status, a friend called 911.  Upon evaluation he is found to be altered and findings consistent with dementia and psychosis.  Patient was treated with Geodon in the 
[facility]  Office: [phone]  Ze Fortune DO, Christiano Herrera DO, Alvin Cummings DO, Lukasz Aaron DO, Garrett Turk MD, Myrna Lopez MD, Eugene Fiore MD, Renita Corbin MD,  Jg Hodgson MD, Markel Grace MD, Chapito Wallace MD,  Christi Mota DO, Roxy Manzano MD, Dominick Montoya MD, Irvin Fortune DO, Bonita Briseno MD,  Dale Hampton DO, Kya Aragon MD, Carin Amador MD, Daily De La Torre MD, Harinder Garcia MD,  Salomón Ricks MD, Steven Barrientos MD, Blaire Muro MD, Mora Jacobo MD, Bunny Omer MD, Libra Huerta MD, Ciro Bonds DO, Nino Salazar DO, Garrett Copeland MD,  Yovany Rosales MD, Shirley Waterhouse, CNP,  Lauren Masrh, CNP, Carlos Caballero, CNP,  Rossy Gutierrez, DNP, Sakshi Storm, CNP, Racquel Garza, CNP, Malorie Rico CNP, Barb Zuñiga, CNP, Amara Galvan, CNP, Greta Shen, PA-C, Ladonna Gary, PA-C, Linnea Catalan, CNP, Georgie Leung, CNP, Farzaneh Stringer, CNP, Aruna Lamb, CNS, Doris Johnston, CNP, Shabana Kirkpatrick, CNP, Tracy Schwab, CNP         [facility]   IN-PATIENT SERVICE   [facility]    Progress Note    3/23/2024    9:09 AM    Name:   Alonzo Crespo  MRN:     [id number]     Acct:      [id number]   Room:   1009/1009-02   Day:  1  Admit Date:  3/22/2024 10:08 AM    PCP:   Ana Méndez  Code Status:  Full Code    Subjective:     C/C:   Chief Complaint   Patient presents with    Altered Mental Status     Interval History Status: worsened.     Sleepy this am-received ativan overnight  Impulsive and has sitter    Brief History:     This 63 yom presents with ams from home. A friend called 911-reportedly due to the confusion. He is unable to give me any specific info as he is somnolent from receiving geodon in ER. Per ER documentation he wears 2L o2 atc for severe copd. He apparently has had some memory problems recently, forgetful.     Review of Systems:     unobtainable      Medications:     Allergies:  
The patient arrived as a transfer from Progressive unit; awake and alert in wheelchair. The patient was oriented to the room, call light, bed mechanics and safety. Sitter present.   
[facility]  Office: [phone]  Ze Fortune DO, Christiano Herrera DO, Alvin Cummings DO, Lukasz Aaron DO, Garrett Turk MD, Myrna Lopez MD, Eugene Fiore MD, Renita Corbin MD,  Jg Hodgson MD, Markel Grace MD, Chapito Wallace MD,  Christi Mota DO, Roxy Manzano MD, Dominick Montoya MD, Irvin Fortune DO, Bonita Briseno MD,  Dale Hampton DO, Kya Aragon MD, Carin Amador MD, Daily De La Torre MD, Harinder Garcia MD,  Salomón Ricks MD, Steven Barrientos MD, Blaire Muro MD, Mora Jacobo MD, Bunny Omer MD, Libra Huerta MD, Ciro Bonds DO, Nino Salazar DO, Garrett Copeland MD,  Yovany Rosales MD, Shirley Waterhouse, CNP,  Lauren Marsh CNP, Carlos Caballero, CNP,  Rossy Gutierrez, DNP, Sakshi Storm, CNP, Racquel Graza, CNP, Malorie Rico CNP, Barb Zuñiga, CNP, Amara Galvan, CNP, Greta Shen, PA-C, Ladonna Gary, PA-C, Linnea Catalan, CNP, Georgie Leung, CNP, Farzaneh Stringer, CNP, Aruna Lamb, CNS, Doris Johnston, CNP, Shabana Kirkpatrick, CNP, Tracy Schwab, CNP         [facility]   IN-PATIENT SERVICE   [facility]    Progress Note    3/26/2024    9:36 AM    Name:   Alonzo Crespo  MRN:     [id number]     Acct:      [id number]   Room:   1009/1009-02   Day:  4  Admit Date:  3/22/2024 10:08 AM    PCP:   Ana Méndez  Code Status:  Full Code    Subjective:     C/C:   Chief Complaint   Patient presents with    Altered Mental Status     Interval History Status: improved.     More awake this am-though did require ativan this am again for agitation  Impulsive and has sitter    Brief History:     This 63 yom presents with ams from home. A friend called 911-reportedly due to the confusion. He is unable to give me any specific info as he is somnolent from receiving geodon in ER. Per ER documentation he wears 2L o2 atc for severe copd. He apparently has had some memory problems recently, forgetful.     Review of Systems: 
Transitions of Care Pharmacy Service   Medication Review    The patient's list of current home medications has been reviewed.     Source(s) of information: Surescriteagan (crary drug). Per RN patient alert to self only and has no family/caregivers involved with medications    Based on information provided by the above source(s), I have updated the patient's home med list.     Other Notes He is overdue filling magnesium, protonix, and hydrochlorothiazide per [place] drug records         PROVIDER ACTION REQUESTED  Medications that need to be addressed by a physician/nurse practitioner:    Medication Action Requested          Meds ready to be reconciled         Please feel free to call me with any questions about this encounter. Thank you.    Georgie Hancock RPH   Transitions of Care Pharmacy Service  Phone:  [phone]  Fax: [phone]      Electronically signed by Georgie Hancock RPH on 3/22/2024 at 8:34 PM       Medications Prior to Admission: cyclobenzaprine (FLEXERIL) 10 MG tablet, Take 1 tablet by mouth 3 times daily as needed for Muscle spasms  pantoprazole (PROTONIX) 40 MG tablet, Take 1 tablet by mouth daily  magnesium oxide (MAG-OX) 400 MG tablet, Take 1 tablet by mouth daily  hydrochlorothiazide (HYDRODIURIL) 25 MG tablet, Take 25 mg by mouth daily  albuterol (PROVENTIL HFA;VENTOLIN HFA) 108 (90 BASE) MCG/ACT inhaler, Inhale 2 puffs into the lungs every 6 hours as needed for Wheezing  ipratropium (ATROVENT HFA) 17 MCG/ACT inhaler, Inhale 2 puffs into the lungs every 6 hours            For Pharmacy Admin Tracking Only  # meds added to list: 0  # meds removed from list: 3  # meds adjusted on list (dose/frequency/formulation): 3    
[facility]  Office: [phone]  Ze Fortune DO, Christiano Herrera DO, Alvin Cummings DO, Lukasz Aaron DO, Garrett Turk MD, Myrna Lopez MD, Eugene Fiore MD, Renita Corbin MD,  Jg Hodgson MD, Markel Grace MD, Chapito Wallace MD,  Christi Mota DO, Roxy Manzano MD, Dominick Montoya MD, Irvin Fortune DO, Bonita Briseno MD,  Dale Hampton DO, Kya Aragon MD, Carin Amador MD, Daily De La Torre MD, Harinder Garcia MD,  Salomón Ricks MD, Steven Barrientos MD, Blaire Muro MD, Mora Jacobo MD, Bunny Omer MD, Libra Huerta MD, Ciro Bonds DO, Nino Salazar DO, Garrett Copeland MD,  Yovany Rosales MD, Shirley Waterhouse, CNP,  Lauren Marsh, CNP, Carlos Caballero, CNP,  Rossy Gutierrez, DNP, Sakshi Storm, CNP, Racquel Garza, CNP, Malorie Rico CNP, Barb Zuñiga, CNP, Amara Galvan, CNP, Greta Shen, PA-C, Ladonna Gray, PA-C, Linnea Catalan, CNP, Georgie Leung, CNP, Farzaneh Stringer, CNP, Aruna Lamb, CNS, Doris Johnston, CNP, Shabana Kirkpatrick, CNP, Tracy Schwab, CNP         [facility]   IN-PATIENT SERVICE   [facility]    Progress Note    3/23/2024    9:09 AM    Name:   Alonzo Crespo  MRN:     [id number]     Acct:      [id number]   Room:   1009/1009-02   Day:  1  Admit Date:  3/22/2024 10:08 AM    PCP:   Ana Méndez  Code Status:  Full Code    Subjective:     C/C:   Chief Complaint   Patient presents with    Altered Mental Status     Interval History Status: improved.     More awake today; oriented to self,  and address.  Also knew it was march (thought 4th) but said , then   Impulsive and has sitter    States he is going home    Denies complaints    Brief History:     This 63 yom presents with ams from home. A friend called 911-reportedly due to the confusion. He is unable to give me any specific info as he is somnolent from receiving geodon in ER. Per ER documentation he wears 2L o2 atc for severe copd. He apparently has 
Writer bladder scanned pt because pt frequently up to urinate and yells while urinating as if in pain. Bladder scan showed 576mL being retained. Orders given to straight cath but after 3 attempts, one with a coude and a different RN, straight cath continued to be unsuccessful due to resistance. Last bladder scan showed > 476mL. Urology consulted.   
Writer messaged Dr. Meléndez with lastest sodium level of 128.  Dr. Meléndez requested to report sodium level less than 126 or greater than 129 for next draw.  
    Constitutional:  negative for chills, fevers, sweats  Respiratory:  negative for cough, dyspnea on exertion, shortness of breath, wheezing  Cardiovascular:  negative for chest pain, chest pressure/discomfort, lower extremity edema, palpitations  Gastrointestinal:  negative for abdominal pain, constipation, diarrhea, nausea, vomiting  Neurological:  negative for dizziness, headache    Medications:     Allergies:    Allergies   Allergen Reactions    Pcn [Penicillins]      Pt tolerated ceftriaxone during 3/22/2024 admission.        Current Meds:   Scheduled Meds:    metoprolol tartrate  100 mg Oral BID    QUEtiapine  25 mg Oral Nightly    memantine  5 mg Oral BID    tamsulosin  0.4 mg Oral Daily    LORazepam  1 mg IntraVENous Once    rivastigmine  1 patch TransDERmal Daily    urea  30 g Oral Daily    magnesium oxide  400 mg Oral Daily    pantoprazole  40 mg Oral QAM AC    sodium chloride flush  5-40 mL IntraVENous 2 times per day    enoxaparin  40 mg SubCUTAneous Daily     Continuous Infusions:    sodium chloride Stopped (03/30/24 0909)    sodium chloride       PRN Meds: hydrALAZINE, risperiDONE, albuterol sulfate HFA, sodium chloride flush, sodium chloride, potassium chloride **OR** potassium alternative oral replacement **OR** potassium chloride, magnesium sulfate, ondansetron **OR** ondansetron, polyethylene glycol, acetaminophen **OR** acetaminophen, ipratropium 0.5 mg-albuterol 2.5 mg, melatonin    Data:     Past Medical History:   has a past medical history of Asthma, COPD (chronic obstructive pulmonary disease) ([facility]), Depression, Hernia, Hyperlipidemia, and Hypertension.    Social History:   reports that he has quit smoking. He does not have any smokeless tobacco history on file. He reports current alcohol use. He reports that he does not use drugs.     Family History:   Family History   Family history unknown: Yes       Vitals:  BP (!) 164/58   Pulse 74   Temp 97.7 °F (36.5 °C) (Oral)   Resp 18   Ht 
(HCC)    Primary hypertension    Chronic respiratory failure with hypoxia and hypercapnia (HCC)    Tobacco abuse    Altered mental status    Urinary retention       Plan: monitor residual    Renard Edmonds MD  7:19 AM 3/28/2024  
911-reportedly due to the confusion. He is unable to give me any specific info as he is somnolent from receiving geodon in ER. Per ER documentation he wears 2L o2 atc for severe copd. He apparently has had some memory problems recently, forgetful.     3/23-4/10 -long and complicated admission which has included medication alterations, psychiatric evaluation, neurology evaluation, urology evaluation, nephrology consultation, and pulmonology consultation.  All services have been working to maximize patient utility.  Plan for guardianship hearing on 4/11 and placement to be arranged at that time.      Review of Systems:     Review of Systems   Constitutional:  Negative for activity change, chills, fatigue and fever.   HENT:  Negative for sinus pressure and sinus pain.    Eyes:  Negative for photophobia and visual disturbance.   Respiratory:  Negative for cough, shortness of breath and wheezing.    Cardiovascular: Negative.  Negative for chest pain, palpitations and leg swelling.   Gastrointestinal:  Negative for abdominal pain, constipation, diarrhea, nausea and vomiting.   Endocrine: Negative for cold intolerance, heat intolerance and polyuria.   Genitourinary:  Negative for difficulty urinating and urgency.   Musculoskeletal:  Negative for arthralgias and myalgias.   Skin: Negative.  Negative for wound.   Neurological:  Negative for dizziness, syncope, weakness and light-headedness.   Hematological:  Negative for adenopathy. Does not bruise/bleed easily.   Psychiatric/Behavioral:  Positive for agitation and confusion. The patient is not nervous/anxious.         Unchanged from the prior 2 weeks       Medications:     Allergies:    Allergies   Allergen Reactions    Pcn [Penicillins]      Pt tolerated ceftriaxone during 3/22/2024 admission.        Current Meds:   Scheduled Meds:    torsemide  5 mg Oral Daily    amLODIPine  10 mg Oral Daily    QUEtiapine  100 mg Oral Nightly    carvedilol  12.5 mg Oral BID    lisinopril  
Abdomen is soft and nontender, no peripheral edema    CBC:   Lab Results   Component Value Date    WBC 22.1 (H) 03/23/2024    HGB 13.5 03/23/2024    HCT 39.1 (L) 03/23/2024    MCV 94.0 03/23/2024     03/23/2024         CMP:   Lab Results   Component Value Date/Time     03/30/2024 06:50 AM    K 3.5 03/30/2024 06:50 AM    CL 89 03/30/2024 06:50 AM    CO2 37 03/30/2024 06:50 AM    BUN 19 03/30/2024 06:50 AM    CREATININE 0.5 03/30/2024 06:50 AM    GLUCOSE 113 03/30/2024 06:50 AM    CALCIUM 8.5 03/30/2024 06:50 AM    PROT 7.3 03/22/2024 10:21 AM    LABALBU 3.4 03/22/2024 10:21 AM    BILITOT 0.3 03/22/2024 10:21 AM    ALKPHOS 93 03/22/2024 10:21 AM    AST 21 03/22/2024 10:21 AM    ALT 12 03/22/2024 10:21 AM              Radiology:  CT OF THE CHEST WITHOUT CONTRAST 3/24/2024 10:48 am     TECHNIQUE:  CT of the chest was performed without the administration of intravenous  contrast. Multiplanar reformatted images are provided for review. Automated  exposure control, iterative reconstruction, and/or weight based adjustment of  the mA/kV was utilized to reduce the radiation dose to as low as reasonably  achievable.     COMPARISON:  Chest radiograph 03/22/2024     HISTORY:  ORDERING SYSTEM PROVIDED HISTORY: hypoxia, pneumonia  TECHNOLOGIST PROVIDED HISTORY:  hypoxia, pneumonia  Reason for Exam: pt. c/o chest pain and SOB. pt. also has AMS. hx. of smoking  and COPD     FINDINGS:  Mediastinum: Assessment is limited by motion and the absence of contrast.  No  pneumomediastinum.  Few mediastinal nodes are upper limits of normal in  caliber but not frankly enlarged.  The trip are poorly assessed.  Atherosclerotic aorta is not aneurysmal.  Main pulmonary artery is grossly  normal in caliber.  Aortic annular and valvular calcifications.  3 vessel  coronary artery calcifications.  Heart size appears within normal limits.  No  pericardial effusion.     Lungs/pleura: Assessment is limited by motion.  Multifocal 
Independent  Scooting: Independent  Balance  Sitting: Intact  Standing: Intact  Transfer Training  Overall Level of Assistance: Independent  Sit to Stand: Independent  Stand to Sit: Independent  Toilet Transfer: Independent  Gait  Gait Training: Yes  Overall Level of Assistance: Independent  Distance (ft): 70 Feet; 10 ft      PT Exercises  Exercise Treatment: 30 second sit to stand - patient only able to complete 5 reps in 15 seconds and had to quit due to fatigue; static standing balance exercises     Safety Devices  Type of Devices: All fall risk precautions in place;Gait belt;Call light within reach;Patient at risk for falls;Sitter present;Nurse notified;Left in bed       Goals  Short Term Goals  Time Frame for Short Term Goals: 12 visits  Short Term Goal 1: to be independent with ambulation with RW for 200'  Short Term Goal 2: to be independent with all bed mobility  Short Term Goal 3: to be independent with all transfers  Short Term Goal 4: to be able to participate in PT for at least 25 min for strengthening, transfers, and ambulation    Education  Patient Education  Education Given To: Patient  Education Provided Comments: sensory stimulation  Education Method: Verbal;Demonstration  Barriers to Learning: Cognition  Education Outcome: Verbalized understanding;Continued education needed    AM-PAC - Mobility 22/24      Therapy Time   Individual   Time In 0846   Time Out 0856   Minutes 10        BIANCA CANO, PT           
Tobacco abuse    Altered mental status    Urinary retention       Plan: Bladder scan to check residual    Renard Edmonds MD  6:35 AM 4/3/2024  
flush 0.9 % injection 5-40 mL, 2 times per day  sodium chloride flush 0.9 % injection 10 mL, PRN  0.9 % sodium chloride infusion, PRN  potassium chloride (KLOR-CON M) extended release tablet 40 mEq, PRN   Or  potassium bicarb-citric acid (EFFER-K) effervescent tablet 40 mEq, PRN   Or  potassium chloride 10 mEq/100 mL IVPB (Peripheral Line), PRN  magnesium sulfate 1000 mg in dextrose 5% 100 mL IVPB, PRN  enoxaparin (LOVENOX) injection 40 mg, Daily  ondansetron (ZOFRAN-ODT) disintegrating tablet 4 mg, Q8H PRN   Or  ondansetron (ZOFRAN) injection 4 mg, Q6H PRN  polyethylene glycol (GLYCOLAX) packet 17 g, Daily PRN  acetaminophen (TYLENOL) tablet 650 mg, Q6H PRN   Or  acetaminophen (TYLENOL) suppository 650 mg, Q6H PRN  ipratropium 0.5 mg-albuterol 2.5 mg (DUONEB) nebulizer solution 1 Dose, Q4H PRN  melatonin tablet 3 mg, Nightly PRN        Labs:    CBC:  No results for input(s): \"WBC\", \"RBC\", \"HGB\", \"HCT\", \"MCV\", \"MCH\", \"MCHC\", \"RDW\", \"PLT\", \"MPV\" in the last 72 hours.    Chemistry:  Recent Labs     03/25/24  0531 03/26/24  1017 03/27/24  0521   * 130* 130*   K 4.5 3.9 4.2   CL 84* 83* 85*   CO2 42* 43* 42*   GLUCOSE 208* 179* 115*   BUN 38* 27* 14   CREATININE 0.8 0.6* 0.5*   ANIONGAP 4* 4* 3*   LABGLOM >60 >90 >90   CALCIUM 8.7 8.8 9.0     No results for input(s): \"PROT\", \"LABALBU\", \"LABA1C\", \"M5MBYLI\", \"D0QNVFH\", \"FT4\", \"TSH\", \"AST\", \"ALT\", \"LDH\", \"GGT\", \"ALKPHOS\", \"BILITOT\", \"BILIDIR\", \"AMMONIA\", \"AMYLASE\", \"LIPASE\", \"LACTATE\", \"CHOL\", \"HDL\", \"LDLCHOLESTEROL\", \"CHOLHDLRATIO\", \"TRIG\", \"VLDL\", \"PHENYTOIN\", \"PHENYF\" in the last 72 hours.      Magnesium:   Lab Results   Component Value Date/Time    MG 1.9 03/22/2024 10:21 AM     Albumin:   Lab Results   Component Value Date/Time    LABALBU 3.4 03/22/2024 10:21 AM         Radiology:  CT HEAD WO CONTRAST    Result Date: 3/22/2024  EXAMINATION: CT OF THE HEAD WITHOUT CONTRAST  3/22/2024 10:57 am TECHNIQUE: CT of the head was performed without the administration of 
times per day  sodium chloride flush 0.9 % injection 10 mL, PRN  0.9 % sodium chloride infusion, PRN  potassium chloride (KLOR-CON M) extended release tablet 40 mEq, PRN   Or  potassium bicarb-citric acid (EFFER-K) effervescent tablet 40 mEq, PRN   Or  potassium chloride 10 mEq/100 mL IVPB (Peripheral Line), PRN  magnesium sulfate 1000 mg in dextrose 5% 100 mL IVPB, PRN  enoxaparin (LOVENOX) injection 40 mg, Daily  ondansetron (ZOFRAN-ODT) disintegrating tablet 4 mg, Q8H PRN   Or  ondansetron (ZOFRAN) injection 4 mg, Q6H PRN  polyethylene glycol (GLYCOLAX) packet 17 g, Daily PRN  acetaminophen (TYLENOL) tablet 650 mg, Q6H PRN   Or  acetaminophen (TYLENOL) suppository 650 mg, Q6H PRN  ipratropium 0.5 mg-albuterol 2.5 mg (DUONEB) nebulizer solution 1 Dose, Q4H PRN  melatonin tablet 3 mg, Nightly PRN        Labs:    CBC:  No results for input(s): \"WBC\", \"RBC\", \"HGB\", \"HCT\", \"MCV\", \"MCH\", \"MCHC\", \"RDW\", \"PLT\", \"MPV\" in the last 72 hours.    Chemistry:  Recent Labs     03/24/24  0634 03/24/24  0833 03/24/24  1534 03/24/24  1933 03/25/24  0531   *   < > 128* 136 130*   K 3.9  --   --   --  4.5   CL 81*  --   --   --  84*   CO2 44*  --   --   --  42*   GLUCOSE 123*  --   --   --  208*   BUN 33*  --   --   --  38*   CREATININE 0.8  --   --   --  0.8   ANIONGAP 2*  --   --   --  4*   LABGLOM >60  --   --   --  >60   CALCIUM 9.0  --   --   --  8.7    < > = values in this interval not displayed.       No results for input(s): \"PROT\", \"LABALBU\", \"LABA1C\", \"H5NANDL\", \"Z1FVRTJ\", \"FT4\", \"TSH\", \"AST\", \"ALT\", \"LDH\", \"GGT\", \"ALKPHOS\", \"BILITOT\", \"BILIDIR\", \"AMMONIA\", \"AMYLASE\", \"LIPASE\", \"LACTATE\", \"CHOL\", \"HDL\", \"LDLCHOLESTEROL\", \"CHOLHDLRATIO\", \"TRIG\", \"VLDL\", \"PHENYTOIN\", \"PHENYF\" in the last 72 hours.      Magnesium:   Lab Results   Component Value Date/Time    MG 1.9 03/22/2024 10:21 AM     Albumin:   Lab Results   Component Value Date/Time    LABALBU 3.4 03/22/2024 10:21 AM         Radiology:  CT HEAD WO 
(Patient picking up RW and moving very fast with it. Ignored all VCs to slow down, put RW on floor, and stop as lines were getting tangled. Patient not receptive to instructions/ safety education.)  Speed/Mary: Accelerated  Gait Abnormalities: Ataxic (Fair balance (RW seems to slow patient down slightly but also many be more of a fall hazard as he just picks it up unsafely.), no safety awareness.)     PT Exercises  A/AROM Exercises: Patient unable to follow along with seated HEP, patient abruptly laid down and not appropriate at his time.     Safety Devices  Type of Devices: All fall risk precautions in place;Gait belt;Call light within reach;Patient at risk for falls;Sitter present;Nurse notified;Left in bed  Restraints  Restraints Initially in Place: No       Goals  Short Term Goals  Time Frame for Short Term Goals: 12 visits  Short Term Goal 1: to be independent with ambulation with RW for 200'  Short Term Goal 2: to be independent with all bed mobility  Short Term Goal 3: to be independent with all transfers  Short Term Goal 4: to be able to participate in PT for at least 25 min for strengthening, transfers, and ambulation    Education  Patient Education  Education Given To: Patient  Education Provided: Role of Therapy;Plan of Care;Transfer Training;Fall Prevention Strategies  Education Method: Verbal;Demonstration  Barriers to Learning: Cognition  Education Outcome: Verbalized understanding;Continued education needed    AM-PAC - Mobility    AM-PAC Basic Mobility - Inpatient   How much help is needed turning from your back to your side while in a flat bed without using bedrails?: A Little  How much help is needed moving from lying on your back to sitting on the side of a flat bed without using bedrails?: A Little  How much help is needed moving to and from a bed to a chair?: A Little  How much help is needed standing up from a chair using your arms?: A Little  How much help is needed walking in hospital 
Retired  Additional Comments: pt was evicted from apt.  referred to social work note and today's eval for above info.  Pt lives alone. Pt unsafe to return to living alone this date. Pt poor historian. Unsure if wife is still in the picture, pt reffered to her a few times during session.  Vision/Hearing  Vision  Vision: Within Functional Limits  Hearing  Hearing: Within functional limits    Cognition   Orientation  Overall Orientation Status: Impaired  Cognition  Overall Cognitive Status: Exceptions  Arousal/Alertness: Delayed responses to stimuli  Following Commands: Follows one step commands consistently  Attention Span: Difficulty attending to directions;Difficulty dividing attention  Memory: Decreased recall of precautions;Decreased recall of biographical Information;Decreased recall of recent events  Safety Judgement: Decreased awareness of need for assistance;Decreased awareness of need for safety  Problem Solving: Assistance required to identify errors made;Assistance required to implement solutions;Assistance required to generate solutions;Assistance required to correct errors made;Decreased awareness of errors  Insights: Decreased awareness of deficits  Initiation: Requires cues for some  Sequencing: Requires cues for some     Objective   Pulse: 65  Heart Rate Source: Monitor  BP: (!) 152/36  BP Location: Right upper arm  BP Method: Automatic  Patient Position: Sitting  MAP (Calculated): 75  Respirations: 18  SpO2: 97 %  O2 Device: Nasal cannula  Temp: 97.5 °F (36.4 °C)     Observation/Palpation  Posture: Fair  Observation: telemetry, pulse ox        AROM RLE (degrees)  RLE AROM: WNL  AROM LLE (degrees)  LLE AROM : WNL  AROM RUE (degrees)  RUE General AROM: see OT eval for details  AROM LUE (degrees)  LUE General AROM: see OT eval for details  Strength RLE  Strength RLE: WNL  Comment: 5/5 at serge hip knee ankle  Strength LLE  Comment: 5/5 at serge hip knee ankle  Strength RUE  Comment: see OT eval for 
\"POCGLU\" in the last 72 hours.    I/O (24Hr):    Intake/Output Summary (Last 24 hours) at 3/23/2024 0909  Last data filed at 3/23/2024 0332  Gross per 24 hour   Intake --   Output 1100 ml   Net -1100 ml       Labs:  Hematology:  Recent Labs     03/22/24  1021 03/23/24  0337   WBC 11.3 22.1*   RBC 4.49 4.16*   HGB 14.7 13.5   HCT 43.7 39.1*   MCV 97.3 94.0   MCH 32.7 32.5   MCHC 33.6 34.5   RDW 13.9 14.1    371   MPV 9.1 9.4   INR 1.0  --      Chemistry:  Recent Labs     03/22/24  1021 03/22/24  1205 03/22/24  1715 03/22/24  2134 03/23/24  0337 03/23/24  0754   *  --    < > 124* 126* 127*   K 4.5  --   --   --  3.9 3.9   CL 77*  --   --   --  77* 77*   CO2 39*  --   --   --  39* 43*   GLUCOSE 80  --   --   --  143* 129*   BUN 19  --   --   --  25* 26*   CREATININE 0.8  --   --   --  0.7 0.8   MG 1.9  --   --   --   --   --    ANIONGAP 9  --   --   --  10 7*   LABGLOM >60  --   --   --  >60 >60   CALCIUM 10.0  --   --   --  9.1 9.3   PROBNP 139  --   --   --   --   --    TROPHS 35* 35*  --   --   --   --     < > = values in this interval not displayed.     Recent Labs     03/22/24  1021 03/23/24  0754   PROT 7.3  --    LABALBU 3.4*  --    TSH  --  2.06   AST 21  --    ALT 12  --    ALKPHOS 93  --    BILITOT 0.3  --      ABG:No results found for: \"POCPH\", \"PHART\", \"PH\", \"POCPCO2\", \"EYK3QFM\", \"PCO2\", \"POCPO2\", \"PO2ART\", \"PO2\", \"POCHCO3\", \"GAX2SKY\", \"HCO3\", \"NBEA\", \"PBEA\", \"BEART\", \"BE\", \"THGBART\", \"THB\", \"HTG6AZY\", \"AKHX3PMW\", \"Z2YXOVIE\", \"O2SAT\", \"FIO2\"  Lab Results   Component Value Date/Time    SPECIAL RIGHT FOREARM 8ML 03/22/2024 11:15 AM     Lab Results   Component Value Date/Time    CULTURE NO GROWTH 12 HOURS 03/22/2024 11:15 AM       Radiology:  CT HEAD WO CONTRAST    Result Date: 3/22/2024  No acute intracranial abnormality.     XR CHEST PORTABLE    Result Date: 3/22/2024  Bibasilar airspace disease, left greater than right.  Follow-up is recommended to document resolution.       Physical 
puff, 2 puff, Inhalation, Q6H PRN  magnesium oxide (MAG-OX) tablet 400 mg, 400 mg, Oral, Daily  pantoprazole (PROTONIX) tablet 40 mg, 40 mg, Oral, QAM AC  sodium chloride flush 0.9 % injection 5-40 mL, 5-40 mL, IntraVENous, 2 times per day  sodium chloride flush 0.9 % injection 10 mL, 10 mL, IntraVENous, PRN  0.9 % sodium chloride infusion, , IntraVENous, PRN  potassium chloride (KLOR-CON M) extended release tablet 40 mEq, 40 mEq, Oral, PRN **OR** potassium bicarb-citric acid (EFFER-K) effervescent tablet 40 mEq, 40 mEq, Oral, PRN **OR** potassium chloride 10 mEq/100 mL IVPB (Peripheral Line), 10 mEq, IntraVENous, PRN  magnesium sulfate 1000 mg in dextrose 5% 100 mL IVPB, 1,000 mg, IntraVENous, PRN  enoxaparin (LOVENOX) injection 40 mg, 40 mg, SubCUTAneous, Daily  ondansetron (ZOFRAN-ODT) disintegrating tablet 4 mg, 4 mg, Oral, Q8H PRN **OR** ondansetron (ZOFRAN) injection 4 mg, 4 mg, IntraVENous, Q6H PRN  polyethylene glycol (GLYCOLAX) packet 17 g, 17 g, Oral, Daily PRN  acetaminophen (TYLENOL) tablet 650 mg, 650 mg, Oral, Q6H PRN **OR** acetaminophen (TYLENOL) suppository 650 mg, 650 mg, Rectal, Q6H PRN  ipratropium 0.5 mg-albuterol 2.5 mg (DUONEB) nebulizer solution 1 Dose, 1 Dose, Inhalation, Q4H PRN      ASSESSMENT    DSM-5 Diagnosis:    Major neurocognitive impairment with agitation    Patient Active Problem List   Diagnosis    Hyponatremia    COPD, severe (HCC)    Primary hypertension    Tobacco abuse    Major neurocognitive disorder due to another medical condition, with agitation (HCC)        PLAN    Titrate quetiapine to 100 mg nightly  Monitor need and frequency of as needed medication for agitation    Medications Changed Today.  A discussion of risks, benefits, and alternatives was held with the patient and this provider with regards to medication changes.  After this discussion we mutually agreed to proceed with the medication changes.    Additional recommendations will follow the clinical course. 
airways disease. Mild bilateral perinephric stranding which is nonspecific.  Consider correlation with urinary labs to exclude any contributory infectious cause.     CT HEAD WO CONTRAST    Result Date: 3/22/2024  No acute intracranial abnormality.     XR CHEST PORTABLE    Result Date: 3/22/2024  Bibasilar airspace disease, left greater than right.  Follow-up is recommended to document resolution.       Physical Examination:        General appearance:  alert, cooperative and no distress  Mental Status:  oriented to person, place and time and normal affect  Lungs:  clear to auscultation bilaterally, normal effort  Heart:  regular rate and rhythm, no murmur  Abdomen:  soft, nontender, nondistended, normal bowel sounds, no masses, hepatomegaly, splenomegaly  Extremities:  no edema, redness, tenderness in the calves  Skin:  no gross lesions, rashes, induration    Assessment:        Hospital Problems             Last Modified POA    * (Principal) Acute metabolic encephalopathy 3/22/2024 Yes    Hyponatremia 3/22/2024 Yes    Aspiration pneumonia of both lower lobes (HCC) 3/22/2024 Yes    COPD, severe (HCC) 3/22/2024 Yes    Primary hypertension 3/22/2024 Yes    Chronic respiratory failure with hypoxia and hypercapnia (HCC) 3/23/2024 Yes    Tobacco abuse 3/23/2024 Yes    Altered mental status 3/23/2024 Yes    Urinary retention 3/25/2024 Yes       Plan:        Seroquel nightly  Continue Risperdal as needed  Reyes management per urology  Monitor and control blood pressure  Aerosol protocol  Oxygen as needed  Trend labs, correct electrolytes as needed  Await guardianship    Alvin Cummings DO  4/1/2024  9:00 AM   
axillary  adenopathy a. No acute abnormality along the chest wall or visualized  superficial soft tissues.     IMPRESSION:  Multifocal dependent airspace disease which is suspicious for pneumonia in  the appropriate clinical setting, to include sequelae of aspiration.     Bronchial wall thickening may be due to acute or chronic airways disease.     Mild bilateral perinephric stranding which is nonspecific.  Consider  correlation with urinary labs to exclude any contributory infectious cause.    Assessment:  Admit 14d ago for AMS/delerium  HypoNa initial 125 on 30g Urea. Initial urine: Uosm 406 [place] 40/recheck < 20 UCl 40.  I think the urinary retention unfortunately is causing significant impairment with his free water excretion  Urinary retention  VBG = primary resp alk w CO2 retention acute on chronic  HTN BP -> thiazide stopped  Aspiration per CT as noted  E [place] urine w perinephric stranding on levaquin  COPD on home O2    Plan:  Hypertension: On 10 mg of amlodipine 6.25 mg of carvedilol 20 mg of lisinopril and as needed hydralazine for breakthrough  Recheck AM BMP/VBG  Guardian/placement      Please do not hesitate to call with questions.    Dr. Gabriele Butcher M.D.  [phone]    [facility]  
evaluation, education, & procurement;Positioning;Self-Care / ADL    Goals  Patient Goals   Patient goals : I want to go home  Short Term Goals  Time Frame for Short Term Goals: By discharge  Short Term Goal 1: Pt will demo functional mobility with AD and good safety with SUP to increase indep with ADL.  Short Term Goal 2: Pt will demo toileting with good safety and line awareness with SUP to increase indep with ADLs.  Short Term Goal 3: Pt will demo BADL routine with good safety and AD/AE with SUP to increase indep with ADLs.  Short Term Goal 4: Pt will tolerate functional activity ~10 minutes with SpO2 WNL and ECT with SUP to increase indep with ADLs.    AM-PAC Score        AM-PAC Inpatient Daily Activity Raw Score: 19 (03/25/24 1236)  AM-PAC Inpatient ADL T-Scale Score : 40.22 (03/25/24 1236)  ADL Inpatient CMS 0-100% Score: 42.8 (03/25/24 1236)  ADL Inpatient CMS G-Code Modifier : CK (03/25/24 1236)      Therapy Time   Individual Concurrent Group Co-treatment   Time In 1146         Time Out 1211         Minutes [place]     
hypertension: Recently his lisinopril was increased, Coreg was increased.  He is also on Norvasc.  Would avoid adding too many medications at once as avoid hypotension.  If he does not improve can start loop diuretic or Procardia  Continue urea for hyponatremia per nephrology  Encephalopathy most likely related to dementia.  He appears to be at his baseline  Aspiration pneumonia/multifocal pneumonia -completed Levaquin.  Chronic respiratory with hypoxia -now off of oxygen  Severe COPD -bronchodilators.  Urinary retention -continue Flomax.  Voiding.  Urology following.  Monitor postvoid residual.    Medical Decision Making: pola Mota DO  4/8/2024  12:19 PM     
dictations but occasionally words are mis-transcribed.)      Bunny Omer MD  4/5/2024    
pneumonia.  Completed Levaquin.  Aspiration pneumonia/multifocal pneumonia -completed Levaquin.  Chronic respiratory with hypoxia -O2 supplement.  Severe COPD -bronchodilators.  Urinary retention -continue Flomax.  Voiding.  Urology following.  Monitor postvoid residual.    Awaiting court appointed guardianship      Continue to monitor vitals , Intake / output ,  Cell count , HGB , Kidney function, oxygenation  as indicated .   NO family at bedside   Plan discussed with Staff Irvin TOBAR     (Please note that portions of this note were completed with a voice recognition program. Efforts were made to edit the dictations but occasionally words are mis-transcribed.)      Bunny Omer MD  4/6/2024

## 2024-04-11 NOTE — DISCHARGE SUMMARY
[facility]  Office: [phone]  Ze Fortune DO, Christiano Herrera DO, Alvin Cummings DO, Lukasz Aaron DO, Garrett Turk MD, Myrna Lopez MD, Eugene Fiore MD, Renita Corbin MD,  Jg Hodgson MD, Markel Grace MD, Chapito Wallace MD,  Christi Mota DO, Roxy Manzano MD, Dominick Montoya MD, Irvin Fortune DO, Bonita Briseno MD,  Dale Hampton DO, Kya Aragon MD, Carin Amador MD, aDily De La Torre MD, Harinder Garcia MD,  Salomón Ricks MD, Steven Barrientos MD, Blaire Muro MD, Mora Jacobo MD, Bunny Omer MD, Libra Huerta MD, Ciro Bonds DO, Nino Salazar DO, Garrett Copeland MD,  Yovany Rosales MD, Shirley Waterhouse, CNP,  Lauren Marsh CNP, Carlos Caballero, CNP,  Rossy Gutierrez, VIVIAN, Sakshi Storm, CNP, Racquel Garza, CNP, Barb Zuñiga CNP, Amara Galvan, CNP, Greta Shen, PA-C, Ladonna Gary PA-C, Linnea Catalan, CNP, Georgie Leung, CNP, [place], CNP, Farzaneh Stringer, CNP, Malorie Rico, CNP, Aruna Lamb, CNS, Doris Johnston, CNP, Shabana Kirkpatrick CNP, Tracy Schwab, CNP         [facility]   IN-PATIENT SERVICE   [facility]    Discharge Summary     Patient ID: Alonzo Crespo  :  1960   MRN: [id number]     ACCOUNT:  [id number]   Patient's PCP: Ana Méndez  Admit Date: 3/22/2024   Discharge Date: 2024     Length of Stay: 20  Code Status:  Full Code  Admitting Physician: Eugene Fiore MD  Discharge Physician: Carlos C Hurst, APRN - NP     Active Discharge Diagnoses:     Hospital Problem Lists:  Principal Problem:    Major neurocognitive disorder due to another medical condition, with agitation (HCC)  Active Problems:    Hyponatremia    COPD, severe (HCC)    Primary hypertension    Tobacco abuse  Resolved Problems:    Acute metabolic encephalopathy    Aspiration pneumonia of both lower lobes (HCC)    Urinary retention      Admission Condition:  poor     Discharged Condition: stable    Hospital Stay:
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
## 2024-04-11 NOTE — PLAN OF CARE
Pt not alert and oriented. Pt left oxygen on all shift but continued to refuse telemetry and medications.  1-to-1 continues for pt due to impulsiveness to leave and confusion.   Pt on 3L NC; baseline is either 2L or 3L.   Guardianship to be established.  Bed locked and in lowest position, call light within reach, safety maintained.         Problem: Discharge Planning  Goal: Discharge to home or other facility with appropriate resources  3/27/2024 0423 by Rakel Gabriel, RN  Outcome: Progressing  Flowsheets (Taken 3/26/2024 2000)  Discharge to home or other facility with appropriate resources: Identify barriers to discharge with patient and caregiver     Problem: Skin/Tissue Integrity  Goal: Absence of new skin breakdown  Description: 1.  Monitor for areas of redness and/or skin breakdown  2.  Assess vascular access sites hourly  3.  Every 4-6 hours minimum:  Change oxygen saturation probe site  4.  Every 4-6 hours:  If on nasal continuous positive airway pressure, respiratory therapy assess nares and determine need for appliance change or resting period.  3/27/2024 0423 by Rakel Gabriel, RN  Outcome: Progressing     Problem: Safety - Adult  Goal: Free from fall injury  3/27/2024 0423 by Rakel Gabriel, RN  Outcome: Progressing     Problem: Respiratory - Adult  Goal: Achieves optimal ventilation and oxygenation  Outcome: Progressing  Flowsheets (Taken 3/26/2024 2000)  Achieves optimal ventilation and oxygenation: Assess for changes in respiratory status     Problem: Risk for Elopement  Goal: Patient will not exit the unit/facility without proper excort  Outcome: Progressing  Flowsheets (Taken 3/26/2024 2000)  Nursing Interventions for Elopement Risk:   Assist with personal care needs such as toileting, eating, dressing, as needed to reduce the risk of wandering   Make sure patient has all necessary personal care items   Reduce environmental triggers   Shoes and clothing collected and placed in 
  Problem: Discharge Planning  Goal: Discharge to home or other facility with appropriate resources  3/24/2024 0328 by Daysi Hernandez RN  Outcome: Progressing  Flowsheets (Taken 3/23/2024 2000)  Discharge to home or other facility with appropriate resources: Identify barriers to discharge with patient and caregiver     Problem: Skin/Tissue Integrity  Goal: Absence of new skin breakdown  Description: 1.  Monitor for areas of redness and/or skin breakdown  2.  Assess vascular access sites hourly  3.  Every 4-6 hours minimum:  Change oxygen saturation probe site  4.  Every 4-6 hours:  If on nasal continuous positive airway pressure, respiratory therapy assess nares and determine need for appliance change or resting period.  3/24/2024 0328 by Daysi Hernandez RN  Outcome: Progressing     Problem: Safety - Adult  Goal: Free from fall injury  3/24/2024 0328 by Daysi Hernandez RN  Outcome: Progressing     Problem: Respiratory - Adult  Goal: Achieves optimal ventilation and oxygenation  3/24/2024 0328 by Daysi Hernandez RN  Outcome: Progressing  Flowsheets (Taken 3/23/2024 2000)  Achieves optimal ventilation and oxygenation:   Assess for changes in respiratory status   Assess for changes in mentation and behavior   Position to facilitate oxygenation and minimize respiratory effort   Oxygen supplementation based on oxygen saturation or arterial blood gases     Problem: Risk for Elopement  Goal: Patient will not exit the unit/facility without proper excort  3/24/2024 0328 by Daysi Hernandez RN  Outcome: Progressing     Problem: Cardiovascular - Adult  Goal: Maintains optimal cardiac output and hemodynamic stability  3/24/2024 0328 by Daysi Hernandez RN  Outcome: Progressing     Problem: Cardiovascular - Adult  Goal: Absence of cardiac dysrhythmias or at baseline  3/24/2024 0328 by Daysi Hernandez RN  Outcome: Progressing     Problem: Genitourinary - Adult  Goal: Absence of urinary retention  3/24/2024 0328 by Daysi Hernandez 
  Problem: Discharge Planning  Goal: Discharge to home or other facility with appropriate resources  3/26/2024 1726 by Navin Davidson RN  Outcome: Progressing  Note: Patient remains free from falls this shift. Patient has been alert to self, disoriented otherwise. Had episode of agitation this morning and attempted to leave room, but was able to be verbally redirected back to bed. Sitter has remained at bedside, continued need at this time. BP was elevated today, provider notified, new medication orders received. Patient's deterioration index flagged as well for patient, provider also notified of that as well. Per social work, guardianship to still be obtained for patient, who will need cysto per urology once established. Patient refused AM medications but did take later in afternoon. Patient removed telemetry and refused to put back on, has left nasal cannula and continuous pulse ox cord on however. Discharge planning pending, safety measures continued.      Problem: Skin/Tissue Integrity  Goal: Absence of new skin breakdown  Description: 1.  Monitor for areas of redness and/or skin breakdown  2.  Assess vascular access sites hourly  3.  Every 4-6 hours minimum:  Change oxygen saturation probe site  4.  Every 4-6 hours:  If on nasal continuous positive airway pressure, respiratory therapy assess nares and determine need for appliance change or resting period.  3/26/2024 1726 by Navin Davidson RN  Outcome: Progressing     Problem: Safety - Adult  Goal: Free from fall injury  3/26/2024 1726 by Navin Davidson RN  Outcome: Progressing     Problem: Cardiovascular - Adult  Goal: Maintains optimal cardiac output and hemodynamic stability  3/26/2024 1726 by Navin Davidson RN  Outcome: Progressing     Problem: Genitourinary - Adult  Goal: Absence of urinary retention  3/26/2024 1726 by Navin Davidson RN  Outcome: Progressing     Problem: Neurosensory - Adult  Goal: Achieves stable or improved neurological status  Outcome: 
  Problem: Discharge Planning  Goal: Discharge to home or other facility with appropriate resources  3/27/2024 1821 by Navin Davidson, RN  Outcome: Progressing  Note: Patient remains free from falls this shift. Safety sitter remained at bedside. Patient alert to self only, sexually inappropriate to female staff. No episodes of agitation noted this shift. Patient took medications this morning, compliant with vital signs, BP's elevated this morning, PRN medications given. Writer spoke with social work, patient's friend Leslie to assume guardianship, process started per social work. Patient up to bathroom, but did have episode of urine retention. Bladder scan performed which showed 417ml, patient voided after, PVR showed 207. Dr. Edmonds notified, no new orders at this time. Patient leaving nasal cannula on, sats are WNL. Discharge planning pending, safety measures continued.      Problem: Skin/Tissue Integrity  Goal: Absence of new skin breakdown  Description: 1.  Monitor for areas of redness and/or skin breakdown  2.  Assess vascular access sites hourly  3.  Every 4-6 hours minimum:  Change oxygen saturation probe site  4.  Every 4-6 hours:  If on nasal continuous positive airway pressure, respiratory therapy assess nares and determine need for appliance change or resting period.  3/27/2024 0423 by Rakel Gabriel, RN  Outcome: Progressing       Problem: Respiratory - Adult  Goal: Achieves optimal ventilation and oxygenation  3/27/2024 1821 by Navin Davidson, RN  Outcome: Progressing     Problem: Neurosensory - Adult  Goal: Achieves stable or improved neurological status  3/27/2024 1821 by Navin Davidson, RN  Outcome: Not Progressing        
  Problem: Discharge Planning  Goal: Discharge to home or other facility with appropriate resources  4/10/2024 0044 by James Flores RN  Outcome: Progressing  Flowsheets (Taken 4/9/2024 2000)  Discharge to home or other facility with appropriate resources:   Identify barriers to discharge with patient and caregiver   Arrange for needed discharge resources and transportation as appropriate   Identify discharge learning needs (meds, wound care, etc)   Refer to discharge planning if patient needs post-hospital services based on physician order or complex needs related to functional status, cognitive ability or social support system  4/9/2024 1925 by Angelic Gonsales RN  Outcome: Progressing  Flowsheets  Taken 4/9/2024 1925 by Angelic Gonsales RN  Discharge to home or other facility with appropriate resources:   Identify barriers to discharge with patient and caregiver   Arrange for needed discharge resources and transportation as appropriate  Taken 4/9/2024 0926 by Merlyn Su RN  Discharge to home or other facility with appropriate resources:   Identify barriers to discharge with patient and caregiver   Arrange for needed discharge resources and transportation as appropriate   Identify discharge learning needs (meds, wound care, etc)     Problem: Skin/Tissue Integrity  Goal: Absence of new skin breakdown  Description: 1.  Monitor for areas of redness and/or skin breakdown  2.  Assess vascular access sites hourly  3.  Every 4-6 hours minimum:  Change oxygen saturation probe site  4.  Every 4-6 hours:  If on nasal continuous positive airway pressure, respiratory therapy assess nares and determine need for appliance change or resting period.  4/10/2024 0044 by James Flores RN  Outcome: Progressing  4/9/2024 1925 by Angelic Gonsales RN  Outcome: Progressing     Problem: Safety - Adult  Goal: Free from fall injury  4/10/2024 0044 by James Flores RN  Outcome: Progressing  4/9/2024 1925 by Angelic Gonsales 
  Problem: Discharge Planning  Goal: Discharge to home or other facility with appropriate resources  4/11/2024 1646 by Theodora Salinas RN  Outcome: Completed    Problem: Skin/Tissue Integrity  Goal: Absence of new skin breakdown  Description: 1.  Monitor for areas of redness and/or skin breakdown  2.  Assess vascular access sites hourly  3.  Every 4-6 hours minimum:  Change oxygen saturation probe site  4.  Every 4-6 hours:  If on nasal continuous positive airway pressure, respiratory therapy assess nares and determine need for appliance change or resting period.  4/11/2024 1646 by Theodora Salinas RN  Outcome: Completed    Problem: Safety - Adult  Goal: Free from fall injury  4/11/2024 1646 by Theodora Salinas RN  Outcome: Completed       Problem: Respiratory - Adult  Goal: Achieves optimal ventilation and oxygenation  4/11/2024 1646 by Theodora Salinas RN  Outcome: Completed       Problem: Risk for Elopement  Goal: Patient will not exit the unit/facility without proper excort  4/11/2024 1646 by Theodora Salinas RN  Outcome: Completed       Problem: Cardiovascular - Adult  Goal: Maintains optimal cardiac output and hemodynamic stability  4/11/2024 1646 by Theodora Salinas RN  Outcome: Completed    Goal: Absence of cardiac dysrhythmias or at baseline  4/11/2024 1646 by Theodora Salinsa RN  Outcome: Completed         Problem: Genitourinary - Adult  Goal: Absence of urinary retention  4/11/2024 1646 by Theodora Salinas RN  Outcome: Completed       Problem: Metabolic/Fluid and Electrolytes - Adult  Goal: Electrolytes maintained within normal limits  4/11/2024 1646 by Theodora Salinas RN  Outcome: Completed       Problem: Confusion  Goal: Confusion, delirium, dementia, or psychosis is improved or at baseline  Description: INTERVENTIONS:  1. Assess for possible contributors to thought disturbance, including medications, impaired vision or hearing, underlying metabolic abnormalities, dehydration, psychiatric diagnoses, 
  Problem: Discharge Planning  Goal: Discharge to home or other facility with appropriate resources  4/3/2024 1011 by Irvin Quigley, RN  Outcome: Progressing  Flowsheets (Taken 4/3/2024 1011)  Discharge to home or other facility with appropriate resources: Identify barriers to discharge with patient and caregiver     Problem: Skin/Tissue Integrity  Goal: Absence of new skin breakdown  Description: 1.  Monitor for areas of redness and/or skin breakdown  2.  Assess vascular access sites hourly  3.  Every 4-6 hours minimum:  Change oxygen saturation probe site  4.  Every 4-6 hours:  If on nasal continuous positive airway pressure, respiratory therapy assess nares and determine need for appliance change or resting period.  4/3/2024 1011 by Irvin Quigley, RN  Outcome: Progressing     Problem: Safety - Adult  Goal: Free from fall injury  4/3/2024 1011 by Irvin Quigley, RN  Outcome: Progressing  Flowsheets (Taken 4/3/2024 1011)  Free From Fall Injury: Instruct family/caregiver on patient safety     Problem: Respiratory - Adult  Goal: Achieves optimal ventilation and oxygenation  4/3/2024 1011 by Irvin Quigley, RN  Outcome: Progressing  Flowsheets (Taken 4/3/2024 1011)  Achieves optimal ventilation and oxygenation: Assess for changes in respiratory status     
  Problem: Discharge Planning  Goal: Discharge to home or other facility with appropriate resources  Outcome: Progressing  Flowsheets (Taken 4/7/2024 0815)  Discharge to home or other facility with appropriate resources:   Identify barriers to discharge with patient and caregiver   Arrange for needed discharge resources and transportation as appropriate   Identify discharge learning needs (meds, wound care, etc)   Refer to discharge planning if patient needs post-hospital services based on physician order or complex needs related to functional status, cognitive ability or social support system     Problem: Skin/Tissue Integrity  Goal: Absence of new skin breakdown  Description: 1.  Monitor for areas of redness and/or skin breakdown  2.  Assess vascular access sites hourly  3.  Every 4-6 hours minimum:  Change oxygen saturation probe site  4.  Every 4-6 hours:  If on nasal continuous positive airway pressure, respiratory therapy assess nares and determine need for appliance change or resting period.  Outcome: Progressing     Problem: Safety - Adult  Goal: Free from fall injury  Outcome: Progressing     Problem: Respiratory - Adult  Goal: Achieves optimal ventilation and oxygenation  Outcome: Progressing  Flowsheets (Taken 4/7/2024 0815)  Achieves optimal ventilation and oxygenation:   Assess for changes in respiratory status   Assess for changes in mentation and behavior   Position to facilitate oxygenation and minimize respiratory effort     Problem: Risk for Elopement  Goal: Patient will not exit the unit/facility without proper excort  Outcome: Progressing  Flowsheets (Taken 4/7/2024 0815)  Nursing Interventions for Elopement Risk:   Reduce environmental triggers   Communicate/escalate to charge nurse the risk of elopement   Assist with personal care needs such as toileting, eating, dressing, as needed to reduce the risk of wandering     Problem: Cardiovascular - Adult  Goal: Maintains optimal cardiac output and 
  Problem: Discharge Planning  Goal: Discharge to home or other facility with appropriate resources  Outcome: Progressing  Note: Discharge teaching and instructions for diagnosis/procedure explained with patient using teachback method.  Patient remains confused & does not demonstrate understanding regarding prescriptions, follow up appointments, and care of self at home.      Problem: Skin/Tissue Integrity  Goal: Absence of new skin breakdown  Description: 1.  Monitor for areas of redness and/or skin breakdown  2.  Assess vascular access sites hourly  3.  Every 4-6 hours minimum:  Change oxygen saturation probe site  4.  Every 4-6 hours:  If on nasal continuous positive airway pressure, respiratory therapy assess nares and determine need for appliance change or resting period.  Outcome: Progressing  Note: Continuing to monitor for skin integrity risks. Patient independent with turning/repositioning. Turning/repositioning encouraged at least once every 2 hrs, and prn basis. Hygiene care being completed independently per patient; assistance provided when deemed necessary.     Problem: Safety - Adult  Goal: Free from fall injury  Outcome: Progressing  Note: Pt fall risk, fall band present, falling star, safety alarm activated and in use as needed. Hourly rounding performed. Pt encouraged to use call light. See Stephanie fall risk assessment.     Problem: Respiratory - Adult  Goal: Achieves optimal ventilation and oxygenation  Outcome: Progressing  Note: Assess breath sounds every shift and as needed.  Assess oxygenation level & respiration rate.  Encourage coughing & deep breathing.  Encourage use of incentive spirometer.  Assess cough & sputum.  Administer oxygen as needed.     Problem: Risk for Elopement  Goal: Patient will not exit the unit/facility without proper excort  Outcome: Progressing     Problem: Cardiovascular - Adult  Goal: Maintains optimal cardiac output and hemodynamic stability  Outcome: 
  Problem: Respiratory - Adult  Goal: Achieves optimal ventilation and oxygenation  3/22/2024 1930 by Lew Boss, GLORIA  Outcome: Progressing     
  Problem: Respiratory - Adult  Goal: Achieves optimal ventilation and oxygenation  3/23/2024 0748 by Darline Gonzales RCP  Outcome: Progressing  Flowsheets (Taken 3/22/2024 2006 by Loly Mcpherson, RN)  Achieves optimal ventilation and oxygenation:   Assess for changes in respiratory status   Assess for changes in mentation and behavior   Position to facilitate oxygenation and minimize respiratory effort   Oxygen supplementation based on oxygen saturation or arterial blood gases   Respiratory therapy support as indicated  3/22/2024 1930 by Lew Boss RCP  Outcome: Progressing  3/22/2024 1846 by Reyes, Brittnie, RN  Outcome: Progressing     Problem: Metabolic/Fluid and Electrolytes - Adult  Goal: Electrolytes maintained within normal limits  3/23/2024 0333 by Loly Mcpherson, RN  Outcome: Not Progressing  Flowsheets (Taken 3/22/2024 2006)  Electrolytes maintained within normal limits:   Monitor labs and assess patient for signs and symptoms of electrolyte imbalances   Fluid restriction as ordered   Instruct patient on fluid and nutrition restrictions as appropriate     Problem: Confusion  Goal: Confusion, delirium, dementia, or psychosis is improved or at baseline  Description: INTERVENTIONS:  1. Assess for possible contributors to thought disturbance, including medications, impaired vision or hearing, underlying metabolic abnormalities, dehydration, psychiatric diagnoses, and notify attending LIP  2. [place] high risk fall precautions, as indicated  3. Provide frequent short contacts to provide reality reorientation, refocusing and direction  4. Decrease environmental stimuli, including noise as appropriate  5. Monitor and intervene to maintain adequate nutrition, hydration, elimination, sleep and activity  6. If unable to ensure safety without constant attention obtain sitter and review sitter guidelines with assigned personnel  7. Initiate Psychosocial CNS and Spiritual Care consult, as indicated  3/23/2024 
  Problem: Respiratory - Adult  Goal: Achieves optimal ventilation and oxygenation  3/24/2024 0747 by Aide Quispe RCP  Outcome: Progressing  3/24/2024 0328 by Daysi Hernandez RN  Outcome: Progressing  Flowsheets (Taken 3/23/2024 2000)  Achieves optimal ventilation and oxygenation:   Assess for changes in respiratory status   Assess for changes in mentation and behavior   Position to facilitate oxygenation and minimize respiratory effort   Oxygen supplementation based on oxygen saturation or arterial blood gases     
  Problem: Respiratory - Adult  Goal: Achieves optimal ventilation and oxygenation  3/25/2024 0803 by Maira Hawk, RCP  Outcome: Progressing     
  Problem: Respiratory - Adult  Goal: Achieves optimal ventilation and oxygenation  3/27/2024 0820 by Darline Gonzales RCP  Outcome: Progressing  3/27/2024 0423 by Rakel Gabriel RN  Outcome: Progressing  Flowsheets (Taken 3/26/2024 2000)  Achieves optimal ventilation and oxygenation: Assess for changes in respiratory status     
  Problem: Respiratory - Adult  Goal: Achieves optimal ventilation and oxygenation  3/27/2024 1951 by Lew Boss RCP  Outcome: Progressing     Problem: Neurosensory - Adult  Goal: Achieves stable or improved neurological status  3/27/2024 1821 by Navin Davidson, RN  Outcome: Not Progressing     
  Problem: Respiratory - Adult  Goal: Achieves optimal ventilation and oxygenation  Outcome: Progressing     
  Problem: Skin/Tissue Integrity  Goal: Absence of new skin breakdown  Description: 1.  Monitor for areas of redness and/or skin breakdown  2.  Assess vascular access sites hourly  3.  Every 4-6 hours minimum:  Change oxygen saturation probe site  4.  Every 4-6 hours:  If on nasal continuous positive airway pressure, respiratory therapy assess nares and determine need for appliance change or resting period.  4/6/2024 2248 by Edith Herrera RN  Outcome: Progressing  Note: Checked for incontinence every 2 hours and prn.  Pericare as needed.  Assisted to reposition off back frequently.         
  Problem: Skin/Tissue Integrity  Goal: Absence of new skin breakdown  Description: 1.  Monitor for areas of redness and/or skin breakdown  2.  Assess vascular access sites hourly  3.  Every 4-6 hours minimum:  Change oxygen saturation probe site  4.  Every 4-6 hours:  If on nasal continuous positive airway pressure, respiratory therapy assess nares and determine need for appliance change or resting period.  4/7/2024 2158 by Edith Herrera RN  Outcome: Progressing  Note: Checked for incontinence every 2 hours and prn.  Pericare as needed.         
A/O x4 but then will go on about something off, inappropriate behavior/commits cont.  Off the 1:1 now on a tellasitter.  Waiting for guardianship.      Problem: Discharge Planning  Goal: Discharge to home or other facility with appropriate resources  Outcome: Progressing     Problem: Confusion  Goal: Confusion, delirium, dementia, or psychosis is improved or at baseline  Description: INTERVENTIONS:  1. Assess for possible contributors to thought disturbance, including medications, impaired vision or hearing, underlying metabolic abnormalities, dehydration, psychiatric diagnoses, and notify attending LIP  2. [place] high risk fall precautions, as indicated  3. Provide frequent short contacts to provide reality reorientation, refocusing and direction  4. Decrease environmental stimuli, including noise as appropriate  5. Monitor and intervene to maintain adequate nutrition, hydration, elimination, sleep and activity  6. If unable to ensure safety without constant attention obtain sitter and review sitter guidelines with assigned personnel  7. Initiate Psychosocial CNS and Spiritual Care consult, as indicated  Outcome: Progressing     Problem: Risk for Elopement  Goal: Patient will not exit the unit/facility without proper excort  Recent Flowsheet Documentation  Taken 4/5/2024 0945 by Kimmie Russ, RN  Nursing Interventions for Elopement Risk:   Reduce environmental triggers   Communicate/escalate to charge nurse the risk of elopement   Assist with personal care needs such as toileting, eating, dressing, as needed to reduce the risk of wandering     Problem: Nutrition Deficit:  Goal: Optimize nutritional status  Recent Flowsheet Documentation  Taken 4/5/2024 1722 by Malorie Larson, RD, LD  Nutrient intake appropriate for improving, restoring, or maintaining nutritional needs:   Monitor oral intake, labs, and treatment plans   Assess nutritional status and recommend course of action     
Alonzo is not sleeping this shift. Got order for Melatonin, but it was ineffective. He has no s/s or c/o pain. He is confused and agitated at times. He wants to leave, he wants to smoke, he wants to go shopping. He has supplemental O2 in place at 3L/min, but he keeps removing it and desats to low 80s. Have educated on leaving O2 in place and on smoking cessation. Have explained his cough and difficulty breathing would improve with smoking cessation, but he does not understand, and thinks smoking helps him feel better. Tried to explain that was the addiction, but he becomes agitated with conversation. Did discuss using a nicotine patch, but he declined. Pt states he smokes about a pack a day.   Sodium level continues to be low. Notified Dr. Meléndez of sodium level and new order for decreased fluid restriction from 1800 to 1000/day. He seems to have difficulty voiding, and it takes him a while to empty his bladder, but output has been good and urine is clear, light yellow. Spoke to him about Flomax, and he states he can't take it because it gives him a headache. Not sure if this is accurate as patient is confused. He is normal sinus rhythm with PACs on telemetry. Pt has call light in reach and is using appropriately; safety maintained.    Problem: Metabolic/Fluid and Electrolytes - Adult  Goal: Electrolytes maintained within normal limits  Outcome: Not Progressing  Flowsheets (Taken 3/22/2024 2006)  Electrolytes maintained within normal limits:   Monitor labs and assess patient for signs and symptoms of electrolyte imbalances   Fluid restriction as ordered   Instruct patient on fluid and nutrition restrictions as appropriate     Problem: Confusion  Goal: Confusion, delirium, dementia, or psychosis is improved or at baseline  3/23/2024 0333 by Loly Mcpherson, RN  Outcome: Not Progressing  Flowsheets (Taken 3/23/2024 0333)  Effect of thought disturbance (confusion, delirium, dementia, or psychosis) are managed with adequate 
Patient admitted for hyponatremia and pneumopnia. Q3 sodium checks, most recent sodium 127. Nephro consulted. Receiving PO zithro and IV rocephin. Patient alert to self only and impulsive. Telesitter in place. Patient continent this shift, uses urinal at bedside with 1-2 assist. Patient's emergency contact is former caretaker/friend Leslie. Social work following. VSS, call light within reach, safety maintained        Problem: Discharge Planning  Goal: Discharge to home or other facility with appropriate resources  Outcome: Progressing     Problem: Skin/Tissue Integrity  Goal: Absence of new skin breakdown  Description: 1.  Monitor for areas of redness and/or skin breakdown  2.  Assess vascular access sites hourly  3.  Every 4-6 hours minimum:  Change oxygen saturation probe site  4.  Every 4-6 hours:  If on nasal continuous positive airway pressure, respiratory therapy assess nares and determine need for appliance change or resting period.  Outcome: Progressing     Problem: Safety - Adult  Goal: Free from fall injury  Outcome: Progressing     Problem: Respiratory - Adult  Goal: Achieves optimal ventilation and oxygenation  3/22/2024 1846 by Reyes, Brittnie, RN  Outcome: Progressing     
Patient oriented to self, oriented to time but is often confused. Patient is impulsive and inappropriate.   Patient is a 1:1 for safety.  Patient on 3L, oxygen saturation within defined limits.  Patient's fluid restriction was discontinued.  Patient went for a KUB, see results.  Patient had bladder scans and PVRs, see results.  Guardianship pending.  Patient safety maintained.    Problem: Discharge Planning  Goal: Discharge to home or other facility with appropriate resources  3/29/2024 1220 by Judi Stringer RN  Outcome: Progressing     Problem: Safety - Adult  Goal: Free from fall injury  3/29/2024 1220 by Judi Stringer RN  Outcome: Progressing     Problem: Respiratory - Adult  Goal: Achieves optimal ventilation and oxygenation  3/29/2024 1220 by Judi Stringer RN  Outcome: Progressing     Problem: Cardiovascular - Adult  Goal: Maintains optimal cardiac output and hemodynamic stability  3/29/2024 1220 by Judi Stringer RN  Outcome: Progressing     Problem: Cardiovascular - Adult  Goal: Absence of cardiac dysrhythmias or at baseline  3/29/2024 1220 by Judi Stringer RN  Outcome: Progressing     Problem: Genitourinary - Adult  Goal: Absence of urinary retention  3/29/2024 1220 by Judi Stringer RN  Outcome: Progressing     Problem: Metabolic/Fluid and Electrolytes - Adult  Goal: Electrolytes maintained within normal limits  3/29/2024 1220 by Judi Stringer RN  Outcome: Progressing     Problem: Confusion  Goal: Confusion, delirium, dementia, or psychosis is improved or at baseline  Description: INTERVENTIONS:  1. Assess for possible contributors to thought disturbance, including medications, impaired vision or hearing, underlying metabolic abnormalities, dehydration, psychiatric diagnoses, and notify attending LIP  2. [place] high risk fall precautions, as indicated  3. Provide frequent short contacts to provide reality reorientation, refocusing and direction  4. Decrease environmental stimuli, including 
Patient remains alert to self and place only, sometimes alert to situation. Continues to make inappropriate comments to staff but can be redirected. 1:1 sitter in place, remains free from falls  Receiving urea for hyponatremia. Sodium 130 this morning. PVR showed no urinary retention this shift  Still awaiting guardianship at this time  VSS, call light within reach, safety maintained        Problem: Discharge Planning  Goal: Discharge to home or other facility with appropriate resources  4/2/2024 1841 by Reyes, Brittnie, RN  Outcome: Progressing     Problem: Skin/Tissue Integrity  Goal: Absence of new skin breakdown  Description: 1.  Monitor for areas of redness and/or skin breakdown  2.  Assess vascular access sites hourly  3.  Every 4-6 hours minimum:  Change oxygen saturation probe site  4.  Every 4-6 hours:  If on nasal continuous positive airway pressure, respiratory therapy assess nares and determine need for appliance change or resting period.  Outcome: Progressing     Problem: Safety - Adult  Goal: Free from fall injury  4/2/2024 1841 by Reyes, Brittnie, RN  Outcome: Progressing     Problem: Respiratory - Adult  Goal: Achieves optimal ventilation and oxygenation  4/2/2024 1841 by Reyes, Brittnie, RN  Outcome: Progressing     Problem: Cardiovascular - Adult  Goal: Maintains optimal cardiac output and hemodynamic stability  4/2/2024 1841 by Reyes, Brittnie, RN  Outcome: Progressing     Problem: Cardiovascular - Adult  Goal: Absence of cardiac dysrhythmias or at baseline  4/2/2024 1841 by Reyes, Brittnie, RN  Outcome: Progressing     Problem: Genitourinary - Adult  Goal: Absence of urinary retention  4/2/2024 1841 by Reyes, Brittnie, RN  Outcome: Progressing     Problem: Neurosensory - Adult  Goal: Achieves stable or improved neurological status  4/2/2024 1841 by Reyes, Brittnie, RN  Outcome: Progressing     Problem: Neurosensory - Adult  Goal: Remains free of injury related to seizures activity  4/2/2024 1841 
Patient started on Toresemide this shift, patient given prn risperidone this shift. Patient continues to walk in halls stand by with walker. Patient awaiting guardianship and placement.   Problem: Discharge Planning  Goal: Discharge to home or other facility with appropriate resources  Outcome: Progressing  Flowsheets  Taken 4/9/2024 1925 by Angelic Gonsales, AMADOU  Discharge to home or other facility with appropriate resources:   Identify barriers to discharge with patient and caregiver   Arrange for needed discharge resources and transportation as appropriate  Taken 4/9/2024 0926 by Merlyn Su RN  Discharge to home or other facility with appropriate resources:   Identify barriers to discharge with patient and caregiver   Arrange for needed discharge resources and transportation as appropriate   Identify discharge learning needs (meds, wound care, etc)     Problem: Safety - Adult  Goal: Free from fall injury  Outcome: Progressing  Flowsheets (Taken 4/9/2024 1925)  Free From Fall Injury:   Instruct family/caregiver on patient safety   Based on caregiver fall risk screen, instruct family/caregiver to ask for assistance with transferring infant if caregiver noted to have fall risk factors     
Patient up walking halls stand by with walker. Patient is awaiting guardianship hearing on 4/11 and placement.   Problem: Discharge Planning  Goal: Discharge to home or other facility with appropriate resources  Outcome: Progressing  Flowsheets  Taken 4/8/2024 1844 by Angelic Gonsales RN  Discharge to home or other facility with appropriate resources: Identify barriers to discharge with patient and caregiver  Taken 4/8/2024 0814 by Merlyn Su, RN  Discharge to home or other facility with appropriate resources: Identify barriers to discharge with patient and caregiver     Problem: Safety - Adult  Goal: Free from fall injury  Outcome: Progressing  Flowsheets (Taken 4/8/2024 1844)  Free From Fall Injury:   Instruct family/caregiver on patient safety   Based on caregiver fall risk screen, instruct family/caregiver to ask for assistance with transferring infant if caregiver noted to have fall risk factors     Problem: Risk for Elopement  Goal: Patient will not exit the unit/facility without proper excort  Outcome: Progressing  Flowsheets (Taken 4/8/2024 0814 by Merlyn Su, RN)  Nursing Interventions for Elopement Risk:   Reduce environmental triggers   Place patient in room far away from exits and stairways   Make sure patient has all necessary personal care items     
Pt admitted for acute metabolic encephalopathy. Pt confusion improving, pt alert to self and time but still disoriented to place and situation. Pt more calm as well but was making sexual comments at times but easily redirectable. Remained on 2L NC which is pt baseline. Pt able to urinate with no issue overnight multiple times as well and c/o no pain. 1:1 sitter remains in place, bed in lowest position, call light within reach, and bed alarm on.     Problem: Respiratory - Adult  Goal: Achieves optimal ventilation and oxygenation  Outcome: Progressing     Problem: Genitourinary - Adult  Goal: Absence of urinary retention  Outcome: Progressing     Problem: Confusion  Goal: Confusion, delirium, dementia, or psychosis is improved or at baseline  Description: INTERVENTIONS:  1. Assess for possible contributors to thought disturbance, including medications, impaired vision or hearing, underlying metabolic abnormalities, dehydration, psychiatric diagnoses, and notify attending LIP  2. [place] high risk fall precautions, as indicated  3. Provide frequent short contacts to provide reality reorientation, refocusing and direction  4. Decrease environmental stimuli, including noise as appropriate  5. Monitor and intervene to maintain adequate nutrition, hydration, elimination, sleep and activity  6. If unable to ensure safety without constant attention obtain sitter and review sitter guidelines with assigned personnel  7. Initiate Psychosocial CNS and Spiritual Care consult, as indicated  Outcome: Progressing     
Pt alert during shift and oriented to self disoriented to place situation and time. Pt was lethargic this morning and more awake throughout remainder of shift. Pt became more impulsive when awake, restless, and verbalizing sexually inappropriate comments.    at bedside throughout out shift and bed alarm utilized pt remained free from falls and injuries during shift.   Pt NPO this morning for planned cystoscopy, procedure discontinued until further notice and pt diet changed to regular with 1200 mL fluid restriction.   Pt has 771 mL oral intake during this shift and 485 mL urine output with 2 unmeasured occurances.   Skin intact on assessment no signs of skin breakdown.   Vital signs stable pt alternating normal sinus sinus arrhthymia on monitor.     Problem: Discharge Planning  Goal: Discharge to home or other facility with appropriate resources  Outcome: Progressing     Problem: Skin/Tissue Integrity  Goal: Absence of new skin breakdown  Description: 1.  Monitor for areas of redness and/or skin breakdown  2.  Assess vascular access sites hourly  3.  Every 4-6 hours minimum:  Change oxygen saturation probe site  4.  Every 4-6 hours:  If on nasal continuous positive airway pressure, respiratory therapy assess nares and determine need for appliance change or resting period.  Outcome: Progressing     Problem: Safety - Adult  Goal: Free from fall injury  Outcome: Progressing     Problem: Respiratory - Adult  Goal: Achieves optimal ventilation and oxygenation  3/25/2024 1937 by Kenyatta Tee RN  Outcome: Progressing     Problem: Risk for Elopement  Goal: Patient will not exit the unit/facility without proper excort  Outcome: Progressing     Problem: Cardiovascular - Adult  Goal: Maintains optimal cardiac output and hemodynamic stability  Outcome: Progressing     Problem: Genitourinary - Adult  Goal: Absence of urinary retention  Outcome: Progressing     Problem: Metabolic/Fluid and Electrolytes - 
Pt alert with forgetfulness and some confusion at times and is sexually inappropriate at times as well. Pt continues to have 1:1 sitter. Pt did nap at times today. All needs met. Safety maintained.         Problem: Safety - Adult  Goal: Free from fall injury  3/30/2024 1109 by Dorei Cerrato RN  Outcome: Progressing     Problem: Risk for Elopement  Goal: Patient will not exit the unit/facility without proper excort  3/30/2024 1109 by Dorie Cerrato RN  Outcome: Progressing  Flowsheets (Taken 3/30/2024 0900)  Nursing Interventions for Elopement Risk:   Assist with personal care needs such as toileting, eating, dressing, as needed to reduce the risk of wandering   Make sure patient has all necessary personal care items   Reduce environmental triggers   Shoes and clothing collected and placed in gown attire     Problem: Genitourinary - Adult  Goal: Absence of urinary retention  3/30/2024 1109 by Dorie Cerrato RN  Outcome: Progressing     Problem: Confusion  Goal: Confusion, delirium, dementia, or psychosis is improved or at baseline  3/30/2024 1109 by Dorie Cerrato RN  Outcome: Progressing     Problem: Pain  Goal: Verbalizes/displays adequate comfort level or baseline comfort level  3/30/2024 1109 by Dorie Cerrato RN  Outcome: Progressing     
Pt frequently stating \"I'm God, they don't believe who I am\". No aggressive behaviors, pt does remain sexually inappropriate with staff at times, easily redirectable. Fluid restriction ordered again, explained multiple times to pt with no apparent retention of information. Pt asking multiple times for more fluids. Then stating \"I'm God, I make the rules\". Pt getting agitated at times, removing and refusing oxygen at times. Pt continues to refuse telemetry. Sitter remains at bedside, safety maintained.       Problem: Risk for Elopement  Goal: Patient will not exit the unit/facility without proper excort  Outcome: Progressing  Flowsheets (Taken 3/31/2024 0900)  Nursing Interventions for Elopement Risk:   Assist with personal care needs such as toileting, eating, dressing, as needed to reduce the risk of wandering   Make sure patient has all necessary personal care items   Reduce environmental triggers   Shoes and clothing collected and placed in gown attire     Problem: Confusion  Goal: Confusion, delirium, dementia, or psychosis is improved or at baseline  Outcome: Progressing     
Pt had some improvement overnight. Pt was kind and cooperative to staff, took all medication, was redirectable and allowed assessment. Pt free of pain, headache, nausea, falls. Pt had elevated pressures, managed with prn hydralazine. Pt still aox1 to self, believes writer is childhood best friend. Pt still makes sexually inappropriate comments to female staff.  No concerns at this time, wcm.     Problem: Discharge Planning  Goal: Discharge to home or other facility with appropriate resources  3/28/2024 0414 by Bharath Moncada RN  Outcome: Progressing  Flowsheets (Taken 3/27/2024 1900)  Discharge to home or other facility with appropriate resources:   Identify barriers to discharge with patient and caregiver   Arrange for needed discharge resources and transportation as appropriate   Identify discharge learning needs (meds, wound care, etc)   Refer to discharge planning if patient needs post-hospital services based on physician order or complex needs related to functional status, cognitive ability or social support system     Problem: Skin/Tissue Integrity  Goal: Absence of new skin breakdown  Description: 1.  Monitor for areas of redness and/or skin breakdown  2.  Assess vascular access sites hourly  3.  Every 4-6 hours minimum:  Change oxygen saturation probe site  4.  Every 4-6 hours:  If on nasal continuous positive airway pressure, respiratory therapy assess nares and determine need for appliance change or resting period.  Outcome: Progressing     Problem: Safety - Adult  Goal: Free from fall injury  Outcome: Progressing     Problem: Respiratory - Adult  Goal: Achieves optimal ventilation and oxygenation  3/28/2024 0414 by Bharath Moncada, RN  Outcome: Progressing     Problem: Risk for Elopement  Goal: Patient will not exit the unit/facility without proper excort  Outcome: Progressing  Flowsheets (Taken 3/27/2024 1900)  Nursing Interventions for Elopement Risk:   Assist with personal care needs such as 
Pt has been agitated throughout the day and intermittently refusing care, removing oxygen and telementry. Can be hard to redirect and mood fluctuates quick. One-on-One continued and tele sitter D/C. Sodium draws Q4, last sodium was 128. Correcting sodium with Urea and sodium tablet. CT of chest is positive with aspiration pneumonia. Receiving IV rocephin and completed zithromax. Call light within reach. Safety maintained.   Problem: Discharge Planning  Goal: Discharge to home or other facility with appropriate resources  3/24/2024 1729 by Martina Khan  Outcome: Progressing     Problem: Skin/Tissue Integrity  Goal: Absence of new skin breakdown  Description: 1.  Monitor for areas of redness and/or skin breakdown  2.  Assess vascular access sites hourly  3.  Every 4-6 hours minimum:  Change oxygen saturation probe site  4.  Every 4-6 hours:  If on nasal continuous positive airway pressure, respiratory therapy assess nares and determine need for appliance change or resting period.  3/24/2024 1729 by Martina Khan  Outcome: Progressing     Problem: Safety - Adult  Goal: Free from fall injury  3/24/2024 1729 by Martina Khan  Outcome: Progressing     Problem: Respiratory - Adult  Goal: Achieves optimal ventilation and oxygenation  3/24/2024 1729 by Martina Khan  Outcome: Progressing     Problem: Risk for Elopement  Goal: Patient will not exit the unit/facility without proper excort  3/24/2024 1729 by Martina Khan  Outcome: Progressing     Problem: Cardiovascular - Adult  Goal: Maintains optimal cardiac output and hemodynamic stability  3/24/2024 1729 by Martina Khan  Outcome: Progressing     Problem: Cardiovascular - Adult  Goal: Absence of cardiac dysrhythmias or at baseline  3/24/2024 1729 by Martina Khan  Outcome: Progressing     Problem: Genitourinary - Adult  Goal: Absence of urinary retention  3/24/2024 1729 by Martina Khan  Outcome: Progressing     Problem: Metabolic/Fluid and Electrolytes - 
Pt has been intermittently agitated throughout the night, stating that he wants to go down the street to have drinks with his friends. Pt able to be calmed down after a few minutes. Risperdal given.  Q4 neuro checks, pt knows he is in the hospital but unable to tell which one, knows why he is here as well as completely oriented to self, disoriented to year.   IV Levaquin for pneumonia.   Constant observer continued. Pt has been making sexual comments to female staff throughout the night.   Vitals stable and pt denies pain.   He continues to refuse telemetry and now refusing bed side pulse ox.  He has remained free from falls or injury.   All needs met at this time.    Problem: Discharge Planning  Goal: Discharge to home or other facility with appropriate resources  3/29/2024 0326 by Malathi Cordon RN  Outcome: Progressing  Flowsheets (Taken 3/28/2024 2114)  Discharge to home or other facility with appropriate resources: Identify barriers to discharge with patient and caregiver  3/28/2024 1706 by Samra Mittal  Outcome: Progressing     Problem: Skin/Tissue Integrity  Goal: Absence of new skin breakdown  Description: 1.  Monitor for areas of redness and/or skin breakdown  2.  Assess vascular access sites hourly  3.  Every 4-6 hours minimum:  Change oxygen saturation probe site  4.  Every 4-6 hours:  If on nasal continuous positive airway pressure, respiratory therapy assess nares and determine need for appliance change or resting period.  3/29/2024 0326 by Malathi Cordon, RN  Outcome: Progressing  3/28/2024 1706 by Samra Mittal  Outcome: Progressing     Problem: Safety - Adult  Goal: Free from fall injury  3/29/2024 0326 by Malathi Cordon RN  Outcome: Progressing  3/28/2024 1706 by Samra Mittal  Outcome: Progressing     Problem: Respiratory - Adult  Goal: Achieves optimal ventilation and oxygenation  3/29/2024 0326 by Malathi Cordon, RN  Outcome: Progressing  Flowsheets (Taken 3/28/2024 2114)  Achieves optimal 
Pt has been resting comfortably for majority of the day. A 1:1 sitter remains at bedside. Pt has been fairly pleasant today, however intermittently remains inappropriate. Pt had a spell of vomiting and writer gave IV Zofran, pt tolerated lunch well after the nausea/vomiting subsided. Pt remains A&O to self only and will sometimes say things that do not correlate to the conversation. All questions and concerns addressed. Safety maintained. Bed is locked and in lowest position with the call light in reach.    Problem: Discharge Planning  Goal: Discharge to home or other facility with appropriate resources  4/1/2024 1352 by Kylie Gutierrez RN  Outcome: Progressing     Problem: Skin/Tissue Integrity  Goal: Absence of new skin breakdown  Description: 1.  Monitor for areas of redness and/or skin breakdown  2.  Assess vascular access sites hourly  3.  Every 4-6 hours minimum:  Change oxygen saturation probe site  4.  Every 4-6 hours:  If on nasal continuous positive airway pressure, respiratory therapy assess nares and determine need for appliance change or resting period.  4/1/2024 1352 by Kylie Gutierrez RN  Outcome: Progressing     Problem: Safety - Adult  Goal: Free from fall injury  4/1/2024 1352 by Kylie Gutierrez RN  Outcome: Progressing     Problem: Respiratory - Adult  Goal: Achieves optimal ventilation and oxygenation  4/1/2024 1352 by Kylie Gutierrez RN  Outcome: Progressing     Problem: Risk for Elopement  Goal: Patient will not exit the unit/facility without proper excort  4/1/2024 1352 by Kylie Gutierrez RN  Outcome: Progressing     Problem: Cardiovascular - Adult  Goal: Maintains optimal cardiac output and hemodynamic stability  4/1/2024 1352 by Kylie Gutierrez RN  Outcome: Progressing     Problem: Cardiovascular - Adult  Goal: Absence of cardiac dysrhythmias or at baseline  4/1/2024 1352 by Kylie Gutierrez RN  Outcome: Progressing     Problem: Genitourinary - Adult  Goal: Absence of urinary retention  4/1/2024 
Pt has been resting comfortably for majority of the day. Pt continues to be inappropriate but is redirectable. Pt's BP's have been on the higher side today. Writer spoke with attending and got the metoprolol changed to coreg. Pt has been urinating with no complications. Pt has been handling his meals well with no nausea or vomiting present today. All questions and concerns addressed. Safety maintained. Bed is locked and in lowest position with the call light in reach.    Problem: Discharge Planning  Goal: Discharge to home or other facility with appropriate resources  4/4/2024 1506 by Kylie Gutierrez RN  Outcome: Progressing     Problem: Skin/Tissue Integrity  Goal: Absence of new skin breakdown  Description: 1.  Monitor for areas of redness and/or skin breakdown  2.  Assess vascular access sites hourly  3.  Every 4-6 hours minimum:  Change oxygen saturation probe site  4.  Every 4-6 hours:  If on nasal continuous positive airway pressure, respiratory therapy assess nares and determine need for appliance change or resting period.  4/4/2024 1506 by Kylie Gutierrez RN  Outcome: Progressing     Problem: Safety - Adult  Goal: Free from fall injury  4/4/2024 1506 by Kylie Gutierrez RN  Outcome: Progressing     Problem: Respiratory - Adult  Goal: Achieves optimal ventilation and oxygenation  4/4/2024 1506 by Kylie Gutierrez RN  Outcome: Progressing     Problem: Risk for Elopement  Goal: Patient will not exit the unit/facility without proper excort  4/4/2024 1506 by Kylie Gutierrez RN  Outcome: Progressing     Problem: Cardiovascular - Adult  Goal: Maintains optimal cardiac output and hemodynamic stability  4/4/2024 1506 by Kylie Gutierrez RN  Outcome: Progressing     Problem: Genitourinary - Adult  Goal: Absence of urinary retention  4/4/2024 1506 by Kylie Gutierrez RN  Outcome: Progressing     Problem: Metabolic/Fluid and Electrolytes - Adult  Goal: Electrolytes maintained within normal limits  4/4/2024 1506 by Kylie Gutierrez 
Pt has been resting throughout the night.   Blood pressures elevated and managed with PRN hydralazine.   Received melatonin and Risperdal overnight, has had intermittent episodes of agitation.  He is oriented to person and place, continues to be inappropriate towards female staff.   1:1 continues due to pt being impulsive and high risk of falls.  Pt has had adequate urine output, monitoring PVR.  IVF continued, see MAR.  Pt awaiting guardianship.   He has remained free from falls.   All needs met at this time.    Problem: Discharge Planning  Goal: Discharge to home or other facility with appropriate resources  Outcome: Progressing  Flowsheets (Taken 3/29/2024 2000)  Discharge to home or other facility with appropriate resources: Identify barriers to discharge with patient and caregiver     Problem: Skin/Tissue Integrity  Goal: Absence of new skin breakdown  Description: 1.  Monitor for areas of redness and/or skin breakdown  2.  Assess vascular access sites hourly  3.  Every 4-6 hours minimum:  Change oxygen saturation probe site  4.  Every 4-6 hours:  If on nasal continuous positive airway pressure, respiratory therapy assess nares and determine need for appliance change or resting period.  Outcome: Progressing     Problem: Safety - Adult  Goal: Free from fall injury  Outcome: Progressing     Problem: Respiratory - Adult  Goal: Achieves optimal ventilation and oxygenation  Outcome: Progressing  Flowsheets (Taken 3/29/2024 2000)  Achieves optimal ventilation and oxygenation: Assess for changes in respiratory status     Problem: Risk for Elopement  Goal: Patient will not exit the unit/facility without proper excort  Outcome: Progressing  Flowsheets (Taken 3/29/2024 2000)  Nursing Interventions for Elopement Risk:   Assist with personal care needs such as toileting, eating, dressing, as needed to reduce the risk of wandering   Make sure patient has all necessary personal care items   Reduce environmental triggers   
Pt is currently resting in bed A&OX3. He is oriented to self, place, year, but not situation. Sitter in place d/t pt being impulsive, agitated, and a fall risk. Pt has made multiple inappropriate comments throughout the shift. All scheduled meds have been given. See MAR. PRN tylenol given for mild BLE pain. Risperidone given for agitation. Patient satisfied. Vitals have remained stable. Pt called out requesting a sleep aid. Writer contacted on call NP. Order for 6mg melatonin given. Upon returning to pt room he was found asleep and did not want to be bothered. PRN melatonin returned to omnicell and not given. Pt guardianship issues need to be resolved in order to DC.    Standard safety precautions in place. Call light within reach. Bed in locked and lowest position. Sitter at bedside. Bed alarm on. Care ongoing.         Problem: Discharge Planning  Goal: Discharge to home or other facility with appropriate resources  4/5/2024 0312 by Lennox Moraes, RN  Outcome: Progressing  Flowsheets (Taken 4/4/2024 1744 by Alem Flowers, RN)  Discharge to home or other facility with appropriate resources:   Identify barriers to discharge with patient and caregiver   Arrange for needed discharge resources and transportation as appropriate   Identify discharge learning needs (meds, wound care, etc)   Refer to discharge planning if patient needs post-hospital services based on physician order or complex needs related to functional status, cognitive ability or social support system     Problem: Skin/Tissue Integrity  Goal: Absence of new skin breakdown  Description: 1.  Monitor for areas of redness and/or skin breakdown  2.  Assess vascular access sites hourly  3.  Every 4-6 hours minimum:  Change oxygen saturation probe site  4.  Every 4-6 hours:  If on nasal continuous positive airway pressure, respiratory therapy assess nares and determine need for appliance change or resting period.  4/5/2024 0312 by Lennox Moraes, RN  Outcome: 
Pt not alert and oriented and has a 1-to-1 sitter.   Pt was admitted with hyponatremia but last sodium is WDL. Sodiums now drawn with morning BMP.  Pt was originally refusing vitals and his oxygen but once pulse ox was placed pt was sating in the low 70's. Pt verbally aggressive refusing oxygen and wanting to go home. Ativan given to calm pt, then oxygen was placed and pt has since left oxygen on and saturation level has been near 100 and pt has been more cooperative.   Pt frequently required bathroom breaks and is extremely impulsive and quick about getting up making him a fall hazard. Pt yells when he is trying to pee but won't cooperate with staff when asked if he is in pain. Due to frequency and yelling, a bladder scan was done which showed retention. There were 3 attempts to straight cath patient, but none were successful and a urology consult was placed.   Pt on 4L NC  Bed locked and in lowest position, call light within reach, safety maintained.     Problem: Discharge Planning  Goal: Discharge to home or other facility with appropriate resources  3/25/2024 0436 by Rakel Gabriel, RN  Outcome: Progressing  Flowsheets (Taken 3/24/2024 2000)  Discharge to home or other facility with appropriate resources: Identify barriers to discharge with patient and caregiver     Problem: Skin/Tissue Integrity  Goal: Absence of new skin breakdown  Description: 1.  Monitor for areas of redness and/or skin breakdown  2.  Assess vascular access sites hourly  3.  Every 4-6 hours minimum:  Change oxygen saturation probe site  4.  Every 4-6 hours:  If on nasal continuous positive airway pressure, respiratory therapy assess nares and determine need for appliance change or resting period.  3/25/2024 0436 by Rakel Gabriel, RN  Outcome: Progressing     Problem: Safety - Adult  Goal: Free from fall injury  3/25/2024 0436 by Rakel Gabriel, RN  Outcome: Progressing     Problem: Respiratory - Adult  Goal: Achieves 
Pt not alert and oriented. Pt has been more cooperative this shift and has left oxygen and telemetry on.   1-to-1 continues for pt due to impulsiveness to leave and confusion.   Pt given PRN ativan once for trying to get out of bed and agitation. PT tolerated well.   Pt urine output has been WDL and pt doesn't seem to be in as much pain when urinating however still yells some of the time.   Pt on 3L NC; baseline is either 2L or 3L.   Guardianship to be established.  Bed locked and in lowest position, call light within reach, safety maintained.     Problem: Discharge Planning  Goal: Discharge to home or other facility with appropriate resources  3/26/2024 0522 by Rakel Gabriel, RN  Outcome: Progressing  Flowsheets (Taken 3/25/2024 2000)  Discharge to home or other facility with appropriate resources: Identify barriers to discharge with patient and caregiver     Problem: Skin/Tissue Integrity  Goal: Absence of new skin breakdown  Description: 1.  Monitor for areas of redness and/or skin breakdown  2.  Assess vascular access sites hourly  3.  Every 4-6 hours minimum:  Change oxygen saturation probe site  4.  Every 4-6 hours:  If on nasal continuous positive airway pressure, respiratory therapy assess nares and determine need for appliance change or resting period.  3/26/2024 0522 by Rakel Gabriel, RN  Outcome: Progressing     Problem: Safety - Adult  Goal: Free from fall injury  3/26/2024 0522 by Rakel Gabriel, RN  Outcome: Progressing     Problem: Respiratory - Adult  Goal: Achieves optimal ventilation and oxygenation  3/26/2024 0522 by Rakel Gabriel, RN  Outcome: Progressing  Flowsheets (Taken 3/25/2024 2000)  Achieves optimal ventilation and oxygenation: Assess for changes in respiratory status     Problem: Risk for Elopement  Goal: Patient will not exit the unit/facility without proper excort  3/26/2024 0522 by Rakel Gabriel, RN  Outcome: Progressing  Flowsheets (Taken 3/25/2024 
Pt waiting on guardianship hearing tomorrow 11am. Should be set for discharge after.   Problem: Discharge Planning  Goal: Discharge to home or other facility with appropriate resources  Outcome: Progressing     Problem: Skin/Tissue Integrity  Goal: Absence of new skin breakdown  Description: 1.  Monitor for areas of redness and/or skin breakdown  2.  Assess vascular access sites hourly  3.  Every 4-6 hours minimum:  Change oxygen saturation probe site  4.  Every 4-6 hours:  If on nasal continuous positive airway pressure, respiratory therapy assess nares and determine need for appliance change or resting period.  Outcome: Progressing     Problem: Safety - Adult  Goal: Free from fall injury  Outcome: Progressing     Problem: Respiratory - Adult  Goal: Achieves optimal ventilation and oxygenation  Outcome: Progressing     Problem: Risk for Elopement  Goal: Patient will not exit the unit/facility without proper excort  Outcome: Progressing  Flowsheets (Taken 4/10/2024 3270)  Nursing Interventions for Elopement Risk:   Place patient in room far away from exits and stairways   Reduce environmental triggers   Make sure patient has all necessary personal care items     Problem: Cardiovascular - Adult  Goal: Maintains optimal cardiac output and hemodynamic stability  Outcome: Progressing  Goal: Absence of cardiac dysrhythmias or at baseline  Outcome: Progressing     Problem: Genitourinary - Adult  Goal: Absence of urinary retention  Outcome: Progressing     Problem: Metabolic/Fluid and Electrolytes - Adult  Goal: Electrolytes maintained within normal limits  Outcome: Progressing     Problem: Confusion  Goal: Confusion, delirium, dementia, or psychosis is improved or at baseline  Description: INTERVENTIONS:  1. Assess for possible contributors to thought disturbance, including medications, impaired vision or hearing, underlying metabolic abnormalities, dehydration, psychiatric diagnoses, and notify attending LIP  2. 
VSS.  's - 150's systolic.  One-on-one continues.  Tele-sitter in room.  Patient intermittently agitated and jumped out of bed without warning to use restroom.  1000 fluid restriction.  Q2 hr Sodium levels range from 124 - 126.  Rkaine notified throughout the evening.  Patient has had 250 ml since 0000.  Patient received   2 doses Zyprexa (5 mg once, 2.5 mg once) and prn dose melatonin per Zara LUNSFORD.  Patient did not sleep well d/t Q2 hr Sodium lab draws disrupting sleep.  Respiratory in room for one-on-one and adjusted NC to 3.5L d/t patient O2 recovery slow after ambulating to restroom.  Patient refused to wear O2 this morning and O2 sat dropped to low 80's.  Patient asking repeatedly to get out of hospital and stated he was refusing any further care.  Patient redirected after much persuasion and agreed to put O2 back on.  Patient requested to smoke in restroom and talked nonsensical at times.  Safety maintained, call device in hand, bedside table within reach.  WC.      Problem: Discharge Planning  Goal: Discharge to home or other facility with appropriate resources  3/24/2024 0328 by Daysi Hernandez, RN  Outcome: Progressing  Flowsheets (Taken 3/23/2024 2000)  Discharge to home or other facility with appropriate resources: Identify barriers to discharge with patient and caregiver     Problem: Skin/Tissue Integrity  Goal: Absence of new skin breakdown  Description: 1.  Monitor for areas of redness and/or skin breakdown  2.  Assess vascular access sites hourly  3.  Every 4-6 hours minimum:  Change oxygen saturation probe site  4.  Every 4-6 hours:  If on nasal continuous positive airway pressure, respiratory therapy assess nares and determine need for appliance change or resting period.  3/24/2024 0328 by Daysi Hernandez, RN  Outcome: Progressing     Problem: Safety - Adult  Goal: Free from fall injury  3/24/2024 0328 by Daysi Hernandez, RN  Outcome: Progressing     Problem: Respiratory - Adult  Goal: Achieves 
  Problem: Cardiovascular - Adult  Goal: Maintains optimal cardiac output and hemodynamic stability  Outcome: Progressing  Flowsheets (Taken 4/6/2024 0815)  Maintains optimal cardiac output and hemodynamic stability:   Monitor blood pressure and heart rate   Monitor urine output and notify Licensed Independent Practitioner for values outside of normal range     Problem: Cardiovascular - Adult  Goal: Absence of cardiac dysrhythmias or at baseline  Outcome: Progressing  Flowsheets (Taken 4/6/2024 0815)  Absence of cardiac dysrhythmias or at baseline: Monitor cardiac rate and rhythm     Problem: Genitourinary - Adult  Goal: Absence of urinary retention  Outcome: Progressing  Flowsheets (Taken 4/6/2024 0815)  Absence of urinary retention:   Assess patient’s ability to void and empty bladder   Monitor intake/output and perform bladder scan as needed     Problem: Metabolic/Fluid and Electrolytes - Adult  Goal: Electrolytes maintained within normal limits  Outcome: Progressing  Flowsheets (Taken 4/6/2024 0815)  Electrolytes maintained within normal limits: Monitor labs and assess patient for signs and symptoms of electrolyte imbalances     Problem: Confusion  Goal: Confusion, delirium, dementia, or psychosis is improved or at baseline  Description: INTERVENTIONS:  1. Assess for possible contributors to thought disturbance, including medications, impaired vision or hearing, underlying metabolic abnormalities, dehydration, psychiatric diagnoses, and notify attending LIP  2. [place] high risk fall precautions, as indicated  3. Provide frequent short contacts to provide reality reorientation, refocusing and direction  4. Decrease environmental stimuli, including noise as appropriate  5. Monitor and intervene to maintain adequate nutrition, hydration, elimination, sleep and activity  6. If unable to ensure safety without constant attention obtain sitter and review sitter guidelines with assigned personnel  7. Initiate 
retention  Outcome: Progressing     Problem: Metabolic/Fluid and Electrolytes - Adult  Goal: Electrolytes maintained within normal limits  Outcome: Progressing     Problem: Confusion  Goal: Confusion, delirium, dementia, or psychosis is improved or at baseline  Description: INTERVENTIONS:  1. Assess for possible contributors to thought disturbance, including medications, impaired vision or hearing, underlying metabolic abnormalities, dehydration, psychiatric diagnoses, and notify attending LIP  2. [place] high risk fall precautions, as indicated  3. Provide frequent short contacts to provide reality reorientation, refocusing and direction  4. Decrease environmental stimuli, including noise as appropriate  5. Monitor and intervene to maintain adequate nutrition, hydration, elimination, sleep and activity  6. If unable to ensure safety without constant attention obtain sitter and review sitter guidelines with assigned personnel  7. Initiate Psychosocial CNS and Spiritual Care consult, as indicated  Outcome: Progressing     
status  3/28/2024 1706 by Samra Mittal  Outcome: Progressing     Problem: Neurosensory - Adult  Goal: Remains free of injury related to seizures activity  3/28/2024 1706 by Samra Mittal  Outcome: Progressing     Problem: Skin/Tissue Integrity - Adult  Goal: Skin integrity remains intact  3/28/2024 1706 by Samra Mittal  Outcome: Progressing     Problem: Gastrointestinal - Adult  Goal: Minimal or absence of nausea and vomiting  3/28/2024 1706 by Samra Mittal  Outcome: Progressing     Problem: Risk for Elopement  Goal: Patient will not exit the unit/facility without proper excort  3/28/2024 1706 by Samra Mittal  Outcome: Progressing  Flowsheets (Taken 3/28/2024 1014)  Nursing Interventions for Elopement Risk:   Assist with personal care needs such as toileting, eating, dressing, as needed to reduce the risk of wandering   Make sure patient has all necessary personal care items   Reduce environmental triggers   Shoes and clothing collected and placed in gown attire     Problem: Metabolic/Fluid and Electrolytes - Adult  Goal: Electrolytes maintained within normal limits  3/28/2024 1706 by Samra Mittal  Outcome: Progressing     Problem: Confusion  Goal: Confusion, delirium, dementia, or psychosis is improved or at baseline  Description: INTERVENTIONS:  1. Assess for possible contributors to thought disturbance, including medications, impaired vision or hearing, underlying metabolic abnormalities, dehydration, psychiatric diagnoses, and notify attending LIP  2. [place] high risk fall precautions, as indicated  3. Provide frequent short contacts to provide reality reorientation, refocusing and direction  4. Decrease environmental stimuli, including noise as appropriate  5. Monitor and intervene to maintain adequate nutrition, hydration, elimination, sleep and activity  6. If unable to ensure safety without constant attention obtain sitter and review sitter guidelines with assigned 
RN  Outcome: Progressing     Problem: Metabolic/Fluid and Electrolytes - Adult  Goal: Electrolytes maintained within normal limits  4/11/2024 0058 by James Flores RN  Outcome: Progressing  Flowsheets (Taken 4/10/2024 2000)  Electrolytes maintained within normal limits: Monitor labs and assess patient for signs and symptoms of electrolyte imbalances  4/10/2024 1715 by Anson Kauffman RN  Outcome: Progressing     Problem: Confusion  Goal: Confusion, delirium, dementia, or psychosis is improved or at baseline  Description: INTERVENTIONS:  1. Assess for possible contributors to thought disturbance, including medications, impaired vision or hearing, underlying metabolic abnormalities, dehydration, psychiatric diagnoses, and notify attending LIP  2. [place] high risk fall precautions, as indicated  3. Provide frequent short contacts to provide reality reorientation, refocusing and direction  4. Decrease environmental stimuli, including noise as appropriate  5. Monitor and intervene to maintain adequate nutrition, hydration, elimination, sleep and activity  6. If unable to ensure safety without constant attention obtain sitter and review sitter guidelines with assigned personnel  7. Initiate Psychosocial CNS and Spiritual Care consult, as indicated  4/11/2024 0058 by James Flores RN  Outcome: Progressing  Flowsheets (Taken 4/10/2024 2000)  Effect of thought disturbance (confusion, delirium, dementia, or psychosis) are managed with adequate functional status:   Provide frequent short contacts to provide reality reorientation, refocusing and direction   Decrease environmental stimuli, including noise as appropriate   Assess for contributors to thought disturbance, including medications, impaired vision or hearing, underlying metabolic abnormalities, dehydration, psychiatric diagnoses, notify LIP   [place] high risk fall precautions, as indicated   Monitor and intervene to maintain adequate nutrition, 
activity  4/1/2024 0141 by Ambreen Lepe RN  Outcome: Progressing     Problem: Skin/Tissue Integrity - Adult  Goal: Skin integrity remains intact  4/1/2024 0141 by Ambreen Lepe RN  Outcome: Progressing     Problem: Gastrointestinal - Adult  Goal: Minimal or absence of nausea and vomiting  4/1/2024 0141 by Ambreen Lepe RN  Outcome: Progressing     Problem: Gastrointestinal - Adult  Goal: Maintains adequate nutritional intake  4/1/2024 0141 by Ambreen Lepe RN  Outcome: Progressing     
disturbance (confusion, delirium, dementia, or psychosis) are managed with adequate functional status: Assess for contributors to thought disturbance, including medications, impaired vision or hearing, underlying metabolic abnormalities, dehydration, psychiatric diagnoses, notify LIP     Problem: Pain  Goal: Verbalizes/displays adequate comfort level or baseline comfort level  3/30/2024 2300 by Paloma Schofield RN  Outcome: Progressing     Problem: Nutrition Deficit:  Goal: Optimize nutritional status  3/30/2024 2300 by Paloma Schofield RN  Outcome: Progressing     Problem: Neurosensory - Adult  Goal: Achieves stable or improved neurological status  3/30/2024 2300 by Paloma Schofield RN  Outcome: Progressing  Flowsheets (Taken 3/30/2024 2000)  Achieves stable or improved neurological status: Assess for and report changes in neurological status     Problem: Neurosensory - Adult  Goal: Remains free of injury related to seizures activity  3/30/2024 2300 by Paloma Schofield RN  Outcome: Progressing     Problem: Skin/Tissue Integrity - Adult  Goal: Skin integrity remains intact  3/30/2024 2300 by Paloma Schofield RN  Outcome: Progressing  Flowsheets (Taken 3/30/2024 2000)  Skin Integrity Remains Intact: Monitor for areas of redness and/or skin breakdown     Problem: Gastrointestinal - Adult  Goal: Minimal or absence of nausea and vomiting  3/30/2024 2300 by Paloma Schofield RN  Outcome: Progressing     Problem: Gastrointestinal - Adult  Goal: Maintains adequate nutritional intake  3/30/2024 2300 by Paloma Schofield RN  Outcome: Progressing     
ensure safety without constant attention obtain sitter and review sitter guidelines with assigned personnel  7. Initiate Psychosocial CNS and Spiritual Care consult, as indicated  Outcome: Progressing  Flowsheets (Taken 4/11/2024 0800)  Effect of thought disturbance (confusion, delirium, dementia, or psychosis) are managed with adequate functional status:   Provide frequent short contacts to provide reality reorientation, refocusing and direction   Decrease environmental stimuli, including noise as appropriate   Monitor and intervene to maintain adequate nutrition, hydration, elimination, sleep and activity   If unable to ensure safety without constant attention obtain sitter and review sitter guidelines with assigned personnel   Initiate Psychosocial Clinical Nurse Specialist and Spiritual Care consult, as indicated   [place] high risk fall precautions, as indicated   Assess for contributors to thought disturbance, including medications, impaired vision or hearing, underlying metabolic abnormalities, dehydration, psychiatric diagnoses, notify LIP     Problem: Pain  Goal: Verbalizes/displays adequate comfort level or baseline comfort level  Outcome: Progressing     Problem: Nutrition Deficit:  Goal: Optimize nutritional status  Outcome: Progressing     Problem: Neurosensory - Adult  Goal: Achieves stable or improved neurological status  Outcome: Progressing  Flowsheets (Taken 4/11/2024 0800)  Achieves stable or improved neurological status:   Assess for and report changes in neurological status   Monitor temperature, glucose, and sodium. Initiate appropriate interventions as ordered  Goal: Remains free of injury related to seizures activity  Outcome: Progressing  Flowsheets (Taken 4/11/2024 0800)  Remains free of injury related to seizure activity: Monitor patient for seizure activity, document and report duration and description of seizure to Licensed Independent Practitioner     Problem: Skin/Tissue Integrity - 
INTERVENTIONS:  1. Assess for possible contributors to thought disturbance, including medications, impaired vision or hearing, underlying metabolic abnormalities, dehydration, psychiatric diagnoses, and notify attending LIP  2. [place] high risk fall precautions, as indicated  3. Provide frequent short contacts to provide reality reorientation, refocusing and direction  4. Decrease environmental stimuli, including noise as appropriate  5. Monitor and intervene to maintain adequate nutrition, hydration, elimination, sleep and activity  6. If unable to ensure safety without constant attention obtain sitter and review sitter guidelines with assigned personnel  7. Initiate Psychosocial CNS and Spiritual Care consult, as indicated  4/9/2024 0003 by James Flores, RN  Outcome: Progressing  Flowsheets (Taken 4/8/2024 2000)  Effect of thought disturbance (confusion, delirium, dementia, or psychosis) are managed with adequate functional status:   Assess for contributors to thought disturbance, including medications, impaired vision or hearing, underlying metabolic abnormalities, dehydration, psychiatric diagnoses, notify LIP   [place] high risk fall precautions, as indicated   Provide frequent short contacts to provide reality reorientation, refocusing and direction   Decrease environmental stimuli, including noise as appropriate   Monitor and intervene to maintain adequate nutrition, hydration, elimination, sleep and activity  4/8/2024 1844 by Angelic Gonsales, RN  Outcome: Progressing  Flowsheets (Taken 4/8/2024 0814 by Merlyn Su, RN)  Effect of thought disturbance (confusion, delirium, dementia, or psychosis) are managed with adequate functional status:   Assess for contributors to thought disturbance, including medications, impaired vision or hearing, underlying metabolic abnormalities, dehydration, psychiatric diagnoses, notify [facility]   Waucoma high risk fall precautions, as indicated   Provide frequent short 
vomiting  4/3/2024 0212 by Anson Ghosh, RN  Outcome: Progressing  4/2/2024 1841 by Reyes, Brittnie, RN  Outcome: Progressing  Goal: Maintains adequate nutritional intake  4/3/2024 0212 by Anson Ghosh, RN  Outcome: Progressing  4/2/2024 1841 by Reyes, Brittnie, RN  Outcome: Progressing